# Patient Record
Sex: MALE | Race: BLACK OR AFRICAN AMERICAN | NOT HISPANIC OR LATINO | Employment: OTHER | URBAN - METROPOLITAN AREA
[De-identification: names, ages, dates, MRNs, and addresses within clinical notes are randomized per-mention and may not be internally consistent; named-entity substitution may affect disease eponyms.]

---

## 2020-10-06 PROBLEM — I10 HTN (HYPERTENSION): Status: ACTIVE | Noted: 2020-10-06

## 2020-10-12 ENCOUNTER — TELEPHONE (OUTPATIENT)
Dept: PULMONOLOGY | Facility: CLINIC | Age: 55
End: 2020-10-12

## 2020-10-12 NOTE — TELEPHONE ENCOUNTER
----- Message from Hussain Bailey sent at 10/12/2020  3:37 PM CDT -----  .Type:  Sooner Apoointment Request    Caller is requesting a sooner appointment.  Caller declined first available appointment listed below.  Caller will not accept being placed on the waitlist and is requesting a message be sent to doctor.  Name of Caller:Morro Juarez  When is the first available appointment?11/2020  Symptoms:Plural Effusion  Would the patient rather a call back or a response via [facility] [employer] [facility]? Call back  Best Call Back Number:[phone]  Additional Information:

## 2020-10-12 NOTE — TELEPHONE ENCOUNTER
----- Message from Cora Morse sent at 10/12/2020  2:02 PM CDT -----  Regarding: Urgent Appt Request  ALBINO Winkler would like to refer the following patient to the [facility]. I was unable to find an available appointment meeting the needs of an urgent appointment. Can you please assist with scheduling the following patient? The patients diagnosis is Large pleural effusion [J90]. There is a request placed in epic.   Please let me know if I can help schedule in any way.  Thank you,   Cora   Ext. [phone]  [facility]

## 2020-10-12 NOTE — TELEPHONE ENCOUNTER
Spoke to pt wife.  Offered appt 10/23/20.  She declined and stated she would call his PCP to try to find somewhere else sooner.

## 2024-08-08 ENCOUNTER — OFFICE VISIT (OUTPATIENT)
Dept: PODIATRY | Facility: CLINIC | Age: 59
End: 2024-08-08
Payer: COMMERCIAL

## 2024-08-08 VITALS — BODY MASS INDEX: 33.89 KG/M2 | WEIGHT: 242.06 LBS | HEIGHT: 71 IN

## 2024-08-08 DIAGNOSIS — S90.32XA HEMATOMA OF LEFT FOOT: Primary | ICD-10-CM

## 2024-08-08 PROCEDURE — 3008F BODY MASS INDEX DOCD: CPT | Mod: CPTII,S$GLB,, | Performed by: PODIATRIST

## 2024-08-08 PROCEDURE — 3044F HG A1C LEVEL LT 7.0%: CPT | Mod: CPTII,S$GLB,, | Performed by: PODIATRIST

## 2024-08-08 PROCEDURE — 4010F ACE/ARB THERAPY RXD/TAKEN: CPT | Mod: CPTII,S$GLB,, | Performed by: PODIATRIST

## 2024-08-08 PROCEDURE — 99204 OFFICE O/P NEW MOD 45 MIN: CPT | Mod: S$GLB,,, | Performed by: PODIATRIST

## 2024-08-08 PROCEDURE — 1159F MED LIST DOCD IN RCRD: CPT | Mod: CPTII,S$GLB,, | Performed by: PODIATRIST

## 2024-08-08 PROCEDURE — 99999 PR PBB SHADOW E&M-EST. PATIENT-LVL III: CPT | Mod: PBBFAC,,, | Performed by: PODIATRIST

## 2024-08-08 PROCEDURE — 1160F RVW MEDS BY RX/DR IN RCRD: CPT | Mod: CPTII,S$GLB,, | Performed by: PODIATRIST

## 2024-08-08 RX ORDER — METHYLPREDNISOLONE 4 MG/1
4 TABLET ORAL DAILY
Qty: 1 EACH | Refills: 0 | Status: SHIPPED | OUTPATIENT
Start: 2024-08-08

## 2024-08-08 NOTE — PROGRESS NOTES
Subjective:     Patient ID: Morro Juarez is a 59 y.o. male.    Chief Complaint: Foot Pain (Pt c/o top of left foot swelling/ pain  7/10, non-diabetic pt wears tennis shoes, PCP Patrice Blancas MD last seen 4-10-24)    Morro is a 59 y.o. male who presents to the podiatry clinic  with complaint of  left foot pain. Onset of the symptoms was  07/22/2024 . Precipitating event: He states that he was taking a lawnmower off of a trailer when a large ramp fell onto his left foot. . Current symptoms include: ability to bear weight, but with some pain, stiffness, and swelling. Aggravating factors: any weight bearing. Symptoms have gradually improved. . Evaluation to date: plain films: normal and MRI: normal. Treatment to date: prescription NSAIDS which are somewhat effective and pain medication . Patients rates pain 7/10 on pain scale.    Patient Active Problem List   Diagnosis    Chronic hepatitis C virus infection    HTN (hypertension)    Obesity       Medication List with Changes/Refills   New Medications    METHYLPREDNISOLONE (MEDROL DOSEPACK) 4 MG TABLET    Take 1 tablet (4 mg total) by mouth once daily. Use as instructed on dose pack   Current Medications    ASPIRIN (ECOTRIN) 81 MG EC TABLET    Take 81 mg by mouth.    ATORVASTATIN (LIPITOR) 40 MG TABLET    Take 40 mg by mouth once daily.    ETHAMBUTOL (MYAMBUTOL) 400 MG TAB    TAKE 4 TABLETS BY MOUTH ONCE DAILY    FLUCONAZOLE (DIFLUCAN) 200 MG TAB    Take 400 mg by mouth once daily.    ISONIAZID (NYDRAZID) 300 MG TAB    Take 300 mg by mouth once daily.    LISINOPRIL 10 MG TABLET    Take 10 mg by mouth once daily.    PYRAZINAMIDE 500 MG TAB    TAKE 4 TABLETS BY MOUTH ONCE DAILY    RIFAMPIN (RIFADIN) 300 MG CAPSULE    TAKE 2 CAPSULES ONCE DAILY       Review of patient's allergies indicates:  No Known Allergies    Past Surgical History:   Procedure Laterality Date    ABCESS DRAINAGE  07/28/2021    I&D with biopsies of prevertebral and retropharyngeal soft tissue and  "fluid collection    ABCESS DRAINAGE  06/20/2021    I&D intraorally of retropharyngeal and prevertebral abscess    APPENDECTOMY         Family History   Problem Relation Name Age of Onset    Diabetes Mother      Heart disease Mother      Hypertension Mother      Gout Mother      Diabetes Father      Heart disease Father      Hypertension Father      Gout Father         Social History     Socioeconomic History    Marital status:    Tobacco Use    Smoking status: Never    Smokeless tobacco: Current     Types: Snuff   Substance and Sexual Activity    Alcohol use: Not Currently    Drug use: Never       Vitals:    08/08/24 0734   Weight: 109.8 kg (242 lb 1 oz)   Height: 5' 11" (1.803 m)   PainSc:   7       Hemoglobin A1C   Date Value Ref Range Status   04/10/2024 5.0 4.2 - 5.8 % Final   08/10/2023 4.9 4.2 - 5.8 % Final   02/07/2023 4.9 4.2 - 5.8 % Final       Review of Systems   Constitutional:  Negative for chills and fever.   Respiratory:  Negative for shortness of breath.    Cardiovascular:  Negative for chest pain, palpitations, orthopnea, claudication and leg swelling.   Gastrointestinal:  Negative for diarrhea, nausea and vomiting.   Musculoskeletal:  Negative for joint pain.   Skin:  Negative for rash.   Neurological:  Negative for dizziness, tingling, sensory change, focal weakness and weakness.   Psychiatric/Behavioral: Negative.           Objective:       PHYSICAL EXAM: Apperance: Alert and orient in no distress,well developed, and with good attention to grooming and body habits  Patient presents ambulating in tennis shoes.   Lower Extremity Physical Exam:  VASCULAR: Dorsalis pedis pulses 2/4 bilateral and Posterior Tibial pulses 2/4 bilateral. . Mild edema observed left. Varicosities absent bilateral. Skin temperature of the lower extremities is warm to warm, proximal to distal. Hair growth WNL bilateral.  DERMATOLOGICAL: No skin rashes, subcutaneous nodules, lesions, or ulcers observed bilateral. " Palpable fixated subcutaneous nodule noted to left dorsal foot.  NEUROLOGICAL: Light touch, sharp-dull, proprioception all present and equal bilaterally.    MUSCULOSKELETAL: Muscle strength is 5/5 for foot inverters, everters, plantarflexors, and dorsiflexors. Muscle tone is normal. (+) pain on palpation of dorsal left midfoot.         Assessment:       ICD-10-CM ICD-9-CM   1. Hematoma of left foot  S90.32XA 924.20       Plan:   Hematoma of left foot  -     US Soft Tissue, Lower Extremity, Left; Future; Expected date: 08/08/2024  -     methylPREDNISolone (MEDROL DOSEPACK) 4 mg tablet; Take 1 tablet (4 mg total) by mouth once daily. Use as instructed on dose pack  Dispense: 1 each; Refill: 0      I counseled the patient on his conditions, regarding findings of my examination, my impressions, and usual treatment plan.   Ordered left foot soft tissue ultrasound to rule out hematoma.    Patient instructed on adequate icing techniques. Patient should ice the affected area at least once per day x 10 minutes for 10 days . I advised the  patient that extra icing would also be beneficial to ensure adequate anti inflammatory effect.   Prescribed Medrol Dosepak to be taken as directed on package. Discussed possible increase in blood sugar with taking steroid medication. Patient advised on the possible elevation of blood pressure sugar and caution to take pills as prescribed and to discontinue use if symptoms arise, patient agreed.  Discussed surgical and conservative management of hematoma deformity. Conservatively we did discuss padding, and shoe modifications such as softer shoes with wide toe boxes. Surgically we briefly discussed pre and post operative expectations.   Patient to return aft ultrasound completed.            Marii Pryor DPM  [facility]

## 2024-08-12 ENCOUNTER — HOSPITAL ENCOUNTER (OUTPATIENT)
Dept: RADIOLOGY | Facility: HOSPITAL | Age: 59
Discharge: HOME | End: 2024-08-12
Attending: PODIATRIST
Payer: COMMERCIAL

## 2024-08-12 DIAGNOSIS — S90.32XA HEMATOMA OF LEFT FOOT: ICD-10-CM

## 2024-08-12 PROCEDURE — 76882 US LMTD JT/FCL EVL NVASC XTR: CPT | Mod: TC,LT

## 2024-08-12 PROCEDURE — 76882 US LMTD JT/FCL EVL NVASC XTR: CPT | Mod: 26,LT,, | Performed by: RADIOLOGY

## 2024-08-21 ENCOUNTER — OFFICE VISIT (OUTPATIENT)
Dept: PODIATRY | Facility: CLINIC | Age: 59
End: 2024-08-21
Payer: COMMERCIAL

## 2024-08-21 VITALS — WEIGHT: 242.06 LBS | HEIGHT: 71 IN | BODY MASS INDEX: 33.89 KG/M2

## 2024-08-21 DIAGNOSIS — S90.32XA HEMATOMA OF LEFT FOOT: Primary | ICD-10-CM

## 2024-08-21 DIAGNOSIS — S90.32XD CONTUSION OF LEFT FOOT, SUBSEQUENT ENCOUNTER: ICD-10-CM

## 2024-08-21 PROCEDURE — 4010F ACE/ARB THERAPY RXD/TAKEN: CPT | Mod: CPTII,S$GLB,, | Performed by: PODIATRIST

## 2024-08-21 PROCEDURE — 3044F HG A1C LEVEL LT 7.0%: CPT | Mod: CPTII,S$GLB,, | Performed by: PODIATRIST

## 2024-08-21 PROCEDURE — 1159F MED LIST DOCD IN RCRD: CPT | Mod: CPTII,S$GLB,, | Performed by: PODIATRIST

## 2024-08-21 PROCEDURE — 99213 OFFICE O/P EST LOW 20 MIN: CPT | Mod: S$GLB,,, | Performed by: PODIATRIST

## 2024-08-21 PROCEDURE — 3008F BODY MASS INDEX DOCD: CPT | Mod: CPTII,S$GLB,, | Performed by: PODIATRIST

## 2024-08-21 PROCEDURE — 99999 PR PBB SHADOW E&M-EST. PATIENT-LVL IV: CPT | Mod: PBBFAC,,, | Performed by: PODIATRIST

## 2024-08-21 PROCEDURE — 1160F RVW MEDS BY RX/DR IN RCRD: CPT | Mod: CPTII,S$GLB,, | Performed by: PODIATRIST

## 2024-08-21 NOTE — PROGRESS NOTES
Subjective:     Patient ID: Morro Juarez is a 59 y.o. male.    Chief Complaint: Follow-up (Non-diabetic pt wears slippers, pt c/o the knot is not going down on his left foot, pt states no pain if he's not standing up for a long time and if he doesn't hit it, PCP Patrice Chapman last seen 7/29/2024)    Morro is a 59 y.o. male who presents to the podiatry clinic for follow up of left foot pain. Patient states pain is better and only painful if standing for a long period of time. Current symptoms include: ability to bear weight, but with some pain. Aggravating factors: standing. Symptoms have gradually improved. Treatment to date:  compression, medrol dose pack a little effective . Patients rates pain 5/10 on pain scale. Patient has no other pedal complaints at this time.  HPI:  He states on 07/22/2024 that he was taking a lawnmower off of a trailer when a large ramp fell onto his left foot.    Patient Active Problem List   Diagnosis    Chronic hepatitis C virus infection    HTN (hypertension)    Obesity       Medication List with Changes/Refills   Current Medications    ASPIRIN (ECOTRIN) 81 MG EC TABLET    Take 81 mg by mouth.    ATORVASTATIN (LIPITOR) 40 MG TABLET    Take 40 mg by mouth once daily.    ETHAMBUTOL (MYAMBUTOL) 400 MG TAB    TAKE 4 TABLETS BY MOUTH ONCE DAILY    FLUCONAZOLE (DIFLUCAN) 200 MG TAB    Take 400 mg by mouth once daily.    ISONIAZID (NYDRAZID) 300 MG TAB    Take 300 mg by mouth once daily.    LISINOPRIL 10 MG TABLET    Take 10 mg by mouth once daily.    METHYLPREDNISOLONE (MEDROL DOSEPACK) 4 MG TABLET    Take 1 tablet (4 mg total) by mouth once daily. Use as instructed on dose pack    PYRAZINAMIDE 500 MG TAB    TAKE 4 TABLETS BY MOUTH ONCE DAILY    RIFAMPIN (RIFADIN) 300 MG CAPSULE    TAKE 2 CAPSULES ONCE DAILY       Review of patient's allergies indicates:  No Known Allergies    Past Surgical History:   Procedure Laterality Date    ABCESS DRAINAGE  07/28/2021    I&D with biopsies of  "prevertebral and retropharyngeal soft tissue and fluid collection    ABCESS DRAINAGE  06/20/2021    I&D intraorally of retropharyngeal and prevertebral abscess    APPENDECTOMY         Family History   Problem Relation Name Age of Onset    Diabetes Mother      Heart disease Mother      Hypertension Mother      Gout Mother      Diabetes Father      Heart disease Father      Hypertension Father      Gout Father         Social History     Socioeconomic History    Marital status:    Tobacco Use    Smoking status: Never    Smokeless tobacco: Current     Types: Snuff   Substance and Sexual Activity    Alcohol use: Not Currently    Drug use: Never       Vitals:    08/21/24 0722   Weight: 109.8 kg (242 lb 1 oz)   Height: 5' 11" (1.803 m)   PainSc: 0-No pain       Review of Systems   Constitutional:  Negative for chills and fever.   Respiratory:  Negative for shortness of breath.    Cardiovascular:  Negative for chest pain, palpitations, orthopnea, claudication and leg swelling.   Gastrointestinal:  Negative for diarrhea, nausea and vomiting.   Musculoskeletal:  Negative for joint pain (left dorsal midfoot).   Skin:  Negative for rash.   Neurological:  Negative for dizziness, tingling, sensory change, focal weakness and weakness.   Psychiatric/Behavioral: Negative.             Objective:      PHYSICAL EXAM: Apperance: Alert and orient in no distress,well developed, and with good attention to grooming and body habits  Patient presents ambulating in tennis shoes.   Lower Extremity Physical Exam:  VASCULAR: Dorsalis pedis pulses 2/4 bilateral and Posterior Tibial pulses 2/4 bilateral. . Mild edema observed left. Varicosities absent bilateral. Skin temperature of the lower extremities is warm to warm, proximal to distal. Hair growth WNL bilateral.  DERMATOLOGICAL: No skin rashes, subcutaneous nodules, lesions, or ulcers observed bilateral. Palpable fixated subcutaneous nodule noted to left dorsal foot.  NEUROLOGICAL: Light " touch, sharp-dull, proprioception all present and equal bilaterally.    MUSCULOSKELETAL: Muscle strength is 5/5 for foot inverters, everters, plantarflexors, and dorsiflexors. Muscle tone is normal. (+) decreased pain on palpation of dorsal left midfoot.     TEST RESULTS: Ultrasound of left foot/ankle taken 08/12/2024 reveals large complex fluid collection dorsal foot area of concern presumably a large evolving hematoma given history of contusion           Assessment:       ICD-10-CM ICD-9-CM   1. Hematoma of left foot - Left Foot  S90.32XA 924.20   2. Contusion of left foot, subsequent encounter  S90.32XD V58.89     924.20       Plan:   Hematoma of left foot - Left Foot  -     Case Request Operating Room: EXCISION, MASS    Contusion of left foot, subsequent encounter  -     Case Request Operating Room: EXCISION, MASS      I counseled the patient on his conditions, regarding findings of my examination, my impressions, and usual treatment plan.   Reviewed left foot soft tissue ultrasound to that confirms hematoma.    Patient instructed on adequate icing techniques. Patient should ice the affected area at least once per day x 10 minutes for 10 days . I advised the  patient that extra icing would also be beneficial to ensure adequate anti inflammatory effect.   Prescribed Medrol Dosepak to be taken as directed on package. Discussed possible increase in blood sugar with taking steroid medication. Patient advised on the possible elevation of blood pressure sugar and caution to take pills as prescribed and to discontinue use if symptoms arise, patient agreed.  Discussed surgical and conservative management of hematoma deformity. Conservatively we did discuss padding, and shoe modifications such as softer shoes with wide toe boxes. Surgically we briefly discussed pre and post operative expectations.   Patient wishes to proceed with surgical excision of hematoma.   Patient tentatively scheduled for 09/04/2024 pending  medical clearance.   Consent reviewed and signed with patient.        Marii Pryor DPM  [facility]

## 2024-08-22 DIAGNOSIS — Z01.818 PRE-OP EXAM: Primary | ICD-10-CM

## 2024-08-27 ENCOUNTER — TELEPHONE (OUTPATIENT)
Dept: PREADMISSION TESTING | Facility: HOSPITAL | Age: 59
End: 2024-08-27
Payer: COMMERCIAL

## 2024-08-27 ENCOUNTER — HOSPITAL ENCOUNTER (OUTPATIENT)
Dept: CARDIOLOGY | Facility: HOSPITAL | Age: 59
Discharge: HOME | End: 2024-08-27
Attending: NURSE PRACTITIONER
Payer: COMMERCIAL

## 2024-08-27 ENCOUNTER — OFFICE VISIT (OUTPATIENT)
Dept: INTERNAL MEDICINE | Facility: CLINIC | Age: 59
End: 2024-08-27
Payer: COMMERCIAL

## 2024-08-27 VITALS
RESPIRATION RATE: 18 BRPM | HEART RATE: 78 BPM | DIASTOLIC BLOOD PRESSURE: 68 MMHG | TEMPERATURE: 98 F | OXYGEN SATURATION: 100 % | SYSTOLIC BLOOD PRESSURE: 123 MMHG

## 2024-08-27 DIAGNOSIS — Z01.818 PRE-OP EXAM: ICD-10-CM

## 2024-08-27 DIAGNOSIS — Z79.899 MEDICAL MARIJUANA USE: ICD-10-CM

## 2024-08-27 DIAGNOSIS — M54.12 CERVICAL RADICULOPATHY: ICD-10-CM

## 2024-08-27 DIAGNOSIS — S90.32XA HEMATOMA OF LEFT FOOT: Primary | ICD-10-CM

## 2024-08-27 DIAGNOSIS — Z86.11 HISTORY OF TUBERCULOSIS: ICD-10-CM

## 2024-08-27 PROBLEM — E78.5 HYPERLIPIDEMIA: Status: ACTIVE | Noted: 2024-08-27

## 2024-08-27 LAB
OHS QRS DURATION: 90 MS
OHS QTC CALCULATION: 441 MS

## 2024-08-27 PROCEDURE — 93005 ELECTROCARDIOGRAM TRACING: CPT

## 2024-08-27 PROCEDURE — 93010 ELECTROCARDIOGRAM REPORT: CPT | Mod: ,,, | Performed by: INTERNAL MEDICINE

## 2024-08-27 PROCEDURE — 99999 PR PBB SHADOW E&M-EST. PATIENT-LVL III: CPT | Mod: PBBFAC,,,

## 2024-08-27 RX ORDER — GABAPENTIN 600 MG/1
600 TABLET ORAL 3 TIMES DAILY
COMMUNITY

## 2024-08-27 RX ORDER — CYCLOBENZAPRINE HCL 10 MG
10 TABLET ORAL 3 TIMES DAILY PRN
COMMUNITY

## 2024-08-27 NOTE — PROGRESS NOTES
Preoperative History and Physical                                                            [facility]                                                                   Chief Complaint: Preoperative evaluation     History of Present Illness:      Morro Juarez is a 59 y.o. male who presents to the office today for a preoperative consultation at the request of Dr. REI Pryor who plans on performing a EXCISION, MASS (LEFT) on September 4.     Functional Status:      The patient is able to climb a flight of stairs slowly. The patient is able to ambulate independently without difficulty. The patient's functional status is affected by the surgical problem due to pain. The patient's functional status is not affected by shortness of breath, chest pain, dyspnea on exertion and fatigue.    MET score greater than 4    Patient Anesthesia History:      History of Malignant Hyperthermia: no  History of Pseudocholinesterase Deficiency: no  History of PONV: no  History of difficult intubation: no  History of delayed emergence: no  History of high fever after anesthesia: no    Family Anesthesia History:      History of Malignant Hyperthermia: no  History of Pseudocholinesterase Deficiency: no    Past Medical History:      Past Medical History:   Diagnosis Date    Chronic hepatitis C     Hyperlipidemia     Hypertension     Osteomyelitis     Retropharyngeal abscess 07/2021        Past Surgical History:      Past Surgical History:   Procedure Laterality Date    ABCESS DRAINAGE  07/28/2021    I&D with biopsies of prevertebral and retropharyngeal soft tissue and fluid collection    ABCESS DRAINAGE  06/20/2021    I&D intraorally of retropharyngeal and prevertebral abscess    APPENDECTOMY      CERVICAL FUSION          Social History:      Social History     Socioeconomic History    Marital status:    Tobacco Use    Smoking status: Never    Smokeless tobacco: Current     Types:  Snuff   Substance and Sexual Activity    Alcohol use: Not Currently    Drug use: Yes     Types: Marijuana     Comment: medical marijuana - last use 8/20/24        Family History:      Family History   Problem Relation Name Age of Onset    Diabetes Mother      Heart disease Mother      Hypertension Mother      Gout Mother      Diabetes Father      Heart disease Father      Hypertension Father      Gout Father         Allergies:      Review of patient's allergies indicates:  No Known Allergies    Medications:      Current Outpatient Medications   Medication Sig    aspirin (ECOTRIN) 81 MG EC tablet Take 81 mg by mouth.    atorvastatin (LIPITOR) 40 MG tablet Take 40 mg by mouth once daily.    cyclobenzaprine (FLEXERIL) 10 MG tablet Take 10 mg by mouth 3 (three) times daily as needed for Muscle spasms.    gabapentin (NEURONTIN) 600 MG tablet Take 600 mg by mouth 3 (three) times daily. Takes 1 tablet in the morning and 2 tablets at night    lisinopriL 10 MG tablet Take 10 mg by mouth once daily.    methylPREDNISolone (MEDROL DOSEPACK) 4 mg tablet Take 1 tablet (4 mg total) by mouth once daily. Use as instructed on dose pack     No current facility-administered medications for this visit.       Vitals:      Vitals:    08/27/24 0803   BP: 123/68   Pulse: 78   Resp: 18   Temp: 97.7 °F (36.5 °C)       Review of Systems:        Constitutional: Negative for fever, chills, weight loss, malaise/fatigue and diaphoresis.   HENT: Negative for hearing loss, ear pain, nosebleeds, congestion, sore throat, tinnitus and ear discharge.  + neck pain  Eyes: Negative for blurred vision, double vision, photophobia, pain, discharge and redness.   Respiratory: Negative for cough, hemoptysis, sputum production, shortness of breath, wheezing and stridor.    Cardiovascular: Negative for chest pain, palpitations, orthopnea, claudication, leg swelling and PND.   Gastrointestinal: Negative for heartburn, nausea, vomiting, abdominal pain, diarrhea,  constipation, blood in stool and melena.   Genitourinary: Negative for dysuria, urgency, frequency, hematuria and flank pain.   Musculoskeletal: Negative for myalgias, joint pain and falls. + back pain and + left foot pain  Skin: Negative for itching and rash.   Neurological: Negative for dizziness, tingling, tremors, sensory change, speech change, focal weakness, seizures, loss of consciousness, weakness and headaches.   Endo/Heme/Allergies: Negative for environmental allergies and polydipsia. Does not bruise/bleed easily.   Psychiatric/Behavioral: Negative for depression, suicidal ideas, hallucinations, memory loss and substance abuse. The patient is not nervous/anxious and does not have insomnia.    All 14 systems reviewed and negative except as noted above.    Physical Exam:      Constitutional: Appears well-developed, well-nourished and in no acute distress.  Patient is oriented to person, place, and time.   Head: Normocephalic and atraumatic. Mucous membranes moist.  Neck: Neck supple no mass.   Cardiovascular: Normal rate and regular rhythm.  S1 S2 appreciated by ascultation.  Pulmonary/Chest: Effort normal and clear to auscultation bilaterally. No respiratory distress.   Abdomen: Soft. Non-tender and non-distended. Bowel sounds are normal.   Neurological: Patient is alert and oriented to person, place and time. Moves all extremities.  Skin: Warm and dry. No lesions.  Extremities: No clubbing, cyanosis. Edema to left foot.  Limited range of motion to left foot secondary to pain    Laboratory data:      Reviewed and noted in plan where applicable. Please see chart for full laboratory data.    Lab Results   Component Value Date    INR 1.1 11/23/2020    INR 1.0 11/19/2020       Lab Results   Component Value Date    WBC 10.08 08/27/2024    HGB 13.8 (L) 08/27/2024    HCT 43.7 08/27/2024    MCV 92 08/27/2024     08/27/2024       Comprehensive Metabolic Panel  Order: [id number]  Status: Final result  Dx:  Pre-op exam    0 Result Notes        Component Ref Range & Units 09:15 3 yr ago   Sodium 136 - 145 mmol/L 138 137   Potassium 3.5 - 5.1 mmol/L 4.1    Chloride 95 - 110 mmol/L 109 105 R   CO2 23 - 29 mmol/L 20 Low  24 R   Glucose 70 - 110 mg/dL 99    BUN 8 - 23 mg/dL 19 17 R   Creatinine 0.5 - 1.4 mg/dL 1.3 0.96 R   Calcium 8.7 - 10.5 mg/dL 9.0 8.4 Low  R   Total Protein 6.0 - 8.4 g/dL 7.0    Albumin 3.5 - 5.2 g/dL 3.5    Total Bilirubin 0.1 - 1.0 mg/dL 0.7    Comment: For infants and newborns, interpretation of results should be based  on gestational age, weight and in agreement with clinical  observations.    Premature Infant recommended reference ranges:  Up to 24 hours.............<8.0 mg/dL  Up to 48 hours............<12.0 mg/dL  3-5 days..................<15.0 mg/dL  6-29 days.................<15.0 mg/dL   Alkaline Phosphatase 55 - 135 U/L 167 High     AST 10 - 40 U/L 12    ALT 10 - 44 U/L 18    eGFR >60 mL/min/1.73 m^2 >60    Anion Gap 8 - 16 mmol/L 9 8   Resulting Agency  [facility]        Specimen Collected: 08/27/24 09:15 CDT Last Resulted: 08/27/24 09:43 CDT     Predictors of intubation difficulty:       Morbid obesity? no   Anatomically abnormal facies? no   Prominent incisors? no   Receding mandible? no   Short, thick neck? no   Neck range of motion: abnormal   Dentition: No chipped, loose, or missing teeth.  Based on the Modified Mallampati, patient is a mallampati score: IV (only hard palate visible)- evaluated by anesthesia     Cardiographics:      ECG: Normal sinus rhythm   Nonspecific T wave abnormality   Abnormal ECG   No previous ECGs available   Confirmed by MONISHA QUIÑONEZ MD ([id number]) on 8/27/2024 11:03:20 AM     Echocardiogram: not indicated    Imaging:      Chest x-ray: The lungs are clear.  The cardiac silhouette and mediastinum are   within normal limits.  Osseous structures and soft tissues are within normal   Limits per imaging on 04/10/2024.  CT of chest without contrast  on 04/30/2024 also available.    Assessment and Plan:      1. Hematoma of left foot  Assessment & Plan:  EXCISION, MASS (LEFT) planned per Dr. REI Pryor on 9/4/2024     Known risk factors for perioperative complications: None    Difficulty with intubation is not anticipated.    Cardiac Risk Estimation: Based on the Revised Cardiac Risk index, patient is a Class I risk with a 3.9 % risk of a major cardiac event in a low risk procedure.    1.) Preoperative workup as follows: chest x-ray, ECG, hemoglobin, hematocrit, electrolytes, creatinine, glucose.  2.) Change in medication regimen before surgery: discontinue ASA 5 days before surgery, discontinue NSAIDs 5 days before surgery, hold Metformin 24 hours prior to surgery. Hold all vitamins/supplements for 7 days prior to surgery, with the exception of Potassium and Iron supplementation. Hold semaglutide/tirzepatide for 7 days prior to surgery.   3.) Prophylaxis for cardiac events with perioperative beta-blockers: not indicated.  4.) Invasive hemodynamic monitoring perioperatively: at the discretion of anesthesiologist.  5.) Deep vein thrombosis prophylaxis postoperatively: regimen to be chosen by surgical team.  6.) Surveillance for postoperative MI with ECG immediately postoperatively and on postoperati ve days 1 and 2 AND troponin levels 24 hours postoperatively and on day 4 or hospital discharge (whichever comes first): at the discretion of anesthesiologist.  7.) Current medications which may produce withdrawal symptoms if withheld perioperatively: None   8.) Other measures: Postoperative hypertension management with IV hydralazine until able to take oral medications.     --Morning of Procedure medications: None  --Hold all other medications morning of surgery   --Resume all medications post-operatively  --Hold and all vitamins/supplements 7 days prior to procedure  --Hold oral diabetes medications morning of surgery   --Hold metformin 24 hours prior to surgery    --Hold Ozempic 7 days prior to surgery   --methylprednisone prescribed per podiatry-completed  --aspirin to be held 5 days prior to procedure per primary surgeon- last dose 8/27/2024      2. Pre-op exam  -     Ambulatory referral/consult to Pre-Admit  -     CBC Auto Differential; Future; Expected date: 08/27/2024  -     Comprehensive Metabolic Panel; Future; Expected date: 08/27/2024  -     EKG 12-lead; Future; Expected date: 08/27/2024    3. Cervical radiculopathy  Assessment & Plan:  -chronic status post cervical fusion at [facility] per Dr. Venegas  -patient seen in [facility] with abnormal cervical range of motion noted-evaluated by anesthesia  -gabapentin continued  -Flexeril continued as needed- hold morning of surgery      4. History of tuberculosis  Assessment & Plan:  -patient reports history of pharyngeal abscess with I and D in 2021  -patient treated for TB with ethambutol, isoniazid, pyrazinamide, and rifampin  -CT of chest without contrast showed no residual lung nodules.  Interval resolution of the previously identified 0.5   cm benign pleural-based opacity in the lateral right upper lobe per imaging on 4/30/2024     5. Hypertension  -controlled  -lisinopril continued- hold morning of surgery    6. Hyperlipidemia  -atorvastatin continued nightly    7. Medical marijuana use  -patient reports last use on 08/20/2024  -patient advised to refrain from marijuana use for 72 hours prior to procedure    8. Lumbar spondylosis  - Neurontin continued  -Flexeril continued as needed- hold morning of surgery  -followed outpatient by pain management           Electronically signed by Sara Ibarra NP on 8/27/2024 at 8:24 AM.

## 2024-08-27 NOTE — ASSESSMENT & PLAN NOTE
EXCISION, MASS (LEFT) planned per Dr. REI Pryor on 9/4/2024     Known risk factors for perioperative complications: None    Difficulty with intubation is not anticipated.    Cardiac Risk Estimation: Based on the Revised Cardiac Risk index, patient is a Class I risk with a 3.9 % risk of a major cardiac event in a low risk procedure.    1.) Preoperative workup as follows: chest x-ray, ECG, hemoglobin, hematocrit, electrolytes, creatinine, glucose.  2.) Change in medication regimen before surgery: discontinue ASA 6 days before surgery, discontinue NSAIDs 5 days before surgery, hold Metformin 24 hours prior to surgery. Hold all vitamins/supplements for 7 days prior to surgery, with the exception of Potassium and Iron supplementation. Hold semaglutide/tirzepatide for 7 days prior to surgery.   3.) Prophylaxis for cardiac events with perioperative beta-blockers: not indicated.  4.) Invasive hemodynamic monitoring perioperatively: at the discretion of anesthesiologist.  5.) Deep vein thrombosis prophylaxis postoperatively: regimen to be chosen by surgical team.  6.) Surveillance for postoperative MI with ECG immediately postoperatively and on postoperati ve days 1 and 2 AND troponin levels 24 hours postoperatively and on day 4 or hospital discharge (whichever comes first): at the discretion of anesthesiologist.  7.) Current medications which may produce withdrawal symptoms if withheld perioperatively: None   8.) Other measures: Postoperative hypertension management with IV hydralazine until able to take oral medications.     --Morning of Procedure medications: None  --Hold all other medications morning of surgery   --Resume all medications post-operatively  --Hold and all vitamins/supplements 7 days prior to procedure  --Hold oral diabetes medications morning of surgery   --Hold metformin 24 hours prior to surgery   --Hold Ozempic 7 days prior to surgery   --methylprednisone prescribed per podiatry-completed

## 2024-08-27 NOTE — TELEPHONE ENCOUNTER
To confirm, your doctor has instructed you: Surgery is scheduled for 09/04/24.   Pre admit office will call the afternoon prior to surgery between 1PM and 3PM with arrival time.    Surgery will be at [facility],  The address is [address]. [place] [id number].      IMPORTANT INSTRUCTIONS!    Do not eat or drink after 12 midnight, including water. Do not smoke or use chewing tobacco after 12 midnight!  OK to brush teeth, but no gum, candy, or mints!      *Take only these medicines with a small swallow of water-morning of surgery*     none         ____ Stop taking all vitamins, herbal supplements, Aspirin, & NSAIDS (Ibuprofen, Advil, Aleve) 7 days prior to surgery, as these can thin your blood.    ____ Weight loss medication, such as Adipex and Phentermine, must be stopped 14 days prior to surgery, no exceptions!    *Diabetic Patients: If you take diabetic or weight loss medication, do NOT take morning of surgery unless instructed by Doctor. Metformin to be stopped 24 hrs prior to surgery. DO NOT take short-acting insulin the day of surgery. Only take HALF of your regular dose of long-acting insulin the night before surgery, unless instructed otherwise. Blood sugars will be checked in pre-op.   ~Ozempic/Mounjaro/Wegovy/Trulicity/Semaglutide injections must be stopped 7 days prior to surgery.     Please notify MD office if you develop an active infection, are prescribed antibiotics by someone other than the surgeon doing your surgery, or visit urgent care/ER.      Bathing Instructions:   The night before surgery and the morning prior to coming to the hospital:    - Shower & rinse your body as usual with anti-bacterial Soap (Dial or Lever 2000)   -Hibiclens (if indicated) use AFTER anti-bacterial soap; 1 packet PM/1 packet in AM on surgical site only   -Do not use hibiclens on your head, face, or genitals.    -Do not wash with anti-bacterial soap after you use the hibiclens.    -Do not shave  surgical site 5-7 days prior to surgery.    -Pubic hair 7 days prior to surgery (OBGYN/Urology only).       Additional Instructions:   __ No makeup, powder, lotions, creams, or body spray to skin   __ No deodorant if you are having: breast procedure, PORT, or upper shoulder surgery!   __ No nail polish or artificial nails       **SURGERY WILL BE CANCELLED IF ARTIFICIAL/NAIL POLISH IS PRESENT!!!**  __ Please remove all piercings and leave all jewelry at home.    **SURGERY WILL BE CANCELLED IF PIERCINGS ARE PRESENT!!!**    __ Dentures, Hearing Aids and Contact Lens need to be removed prior to the start of surgery.    __ Avoid Alcoholic beverages 3 days prior to surgery, as it can thin the blood, unless told otherwise by pre-admit department.  __ Females: may need to give a urine sample the morning of surgery;   **Please ask  for a specimen cup if you need to use the restroom prior to being called into pre-op.**  __ Males: Stop ED medications (Viagra, Cialis) 24 hrs prior to surgery.  __ Wear clean, loose-fitting clothing. Allow for dressings/bandages/surgical equipment   __ You must have transportation, and they MUST stay the entire time.   __  Bring photo ID and insurance information to hospital      [facility] Visitor/Ride Policy:   Only 1 adult allowed (over the age of 18) to accompany you and MUST STAY through the entire length of admission.     -Must have a ride home from a responsible adult that you know and trust.    -Medical Transport, Uber or Lyft can only be used if patient has a responsible adult to accompany them during ride home.    ~Your ride MUST STAY the entire time until you are discharged~    *If you are running late or have questions the morning of surgery, please call the Pre-OP Department @ [phone].       *Please Call [facility] Pre-Admit Department for surgery instruction questions:   [phone]       Financial Questions:                                                       Additional Payment Question Numbers:   qhel: [phone]                                                               [phone] or [phone]

## 2024-08-27 NOTE — ASSESSMENT & PLAN NOTE
-patient reports history of pharyngeal abscess with I and D in 2021  -patient treated for TB with ethambutol, isoniazid, pyrazinamide, and rifampin  -CT of chest without contrast showed no residual lung nodules.  Interval resolution of the previously identified 0.5   cm benign pleural-based opacity in the lateral right upper lobe per imaging on 4/30/2024

## 2024-08-27 NOTE — H&P (VIEW-ONLY)
Preoperative History and Physical                                                            [facility]                                                                   Chief Complaint: Preoperative evaluation     History of Present Illness:      Morro Juarez is a 59 y.o. male who presents to the office today for a preoperative consultation at the request of Dr. REI Pryor who plans on performing a EXCISION, MASS (LEFT) on September 4.     Functional Status:      The patient is able to climb a flight of stairs slowly. The patient is able to ambulate independently without difficulty. The patient's functional status is affected by the surgical problem due to pain. The patient's functional status is not affected by shortness of breath, chest pain, dyspnea on exertion and fatigue.    MET score greater than 4    Patient Anesthesia History:      History of Malignant Hyperthermia: no  History of Pseudocholinesterase Deficiency: no  History of PONV: no  History of difficult intubation: no  History of delayed emergence: no  History of high fever after anesthesia: no    Family Anesthesia History:      History of Malignant Hyperthermia: no  History of Pseudocholinesterase Deficiency: no    Past Medical History:      Past Medical History:   Diagnosis Date    Chronic hepatitis C     Hyperlipidemia     Hypertension     Osteomyelitis     Retropharyngeal abscess 07/2021        Past Surgical History:      Past Surgical History:   Procedure Laterality Date    ABCESS DRAINAGE  07/28/2021    I&D with biopsies of prevertebral and retropharyngeal soft tissue and fluid collection    ABCESS DRAINAGE  06/20/2021    I&D intraorally of retropharyngeal and prevertebral abscess    APPENDECTOMY      CERVICAL FUSION          Social History:      Social History     Socioeconomic History    Marital status:    Tobacco Use    Smoking status: Never    Smokeless tobacco: Current     Types:  Snuff   Substance and Sexual Activity    Alcohol use: Not Currently    Drug use: Yes     Types: Marijuana     Comment: medical marijuana - last use 8/20/24        Family History:      Family History   Problem Relation Name Age of Onset    Diabetes Mother      Heart disease Mother      Hypertension Mother      Gout Mother      Diabetes Father      Heart disease Father      Hypertension Father      Gout Father         Allergies:      Review of patient's allergies indicates:  No Known Allergies    Medications:      Current Outpatient Medications   Medication Sig    aspirin (ECOTRIN) 81 MG EC tablet Take 81 mg by mouth.    atorvastatin (LIPITOR) 40 MG tablet Take 40 mg by mouth once daily.    cyclobenzaprine (FLEXERIL) 10 MG tablet Take 10 mg by mouth 3 (three) times daily as needed for Muscle spasms.    gabapentin (NEURONTIN) 600 MG tablet Take 600 mg by mouth 3 (three) times daily. Takes 1 tablet in the morning and 2 tablets at night    lisinopriL 10 MG tablet Take 10 mg by mouth once daily.    methylPREDNISolone (MEDROL DOSEPACK) 4 mg tablet Take 1 tablet (4 mg total) by mouth once daily. Use as instructed on dose pack     No current facility-administered medications for this visit.       Vitals:      Vitals:    08/27/24 0803   BP: 123/68   Pulse: 78   Resp: 18   Temp: 97.7 °F (36.5 °C)       Review of Systems:        Constitutional: Negative for fever, chills, weight loss, malaise/fatigue and diaphoresis.   HENT: Negative for hearing loss, ear pain, nosebleeds, congestion, sore throat, tinnitus and ear discharge.  + neck pain  Eyes: Negative for blurred vision, double vision, photophobia, pain, discharge and redness.   Respiratory: Negative for cough, hemoptysis, sputum production, shortness of breath, wheezing and stridor.    Cardiovascular: Negative for chest pain, palpitations, orthopnea, claudication, leg swelling and PND.   Gastrointestinal: Negative for heartburn, nausea, vomiting, abdominal pain, diarrhea,  constipation, blood in stool and melena.   Genitourinary: Negative for dysuria, urgency, frequency, hematuria and flank pain.   Musculoskeletal: Negative for myalgias, joint pain and falls. + back pain and + left foot pain  Skin: Negative for itching and rash.   Neurological: Negative for dizziness, tingling, tremors, sensory change, speech change, focal weakness, seizures, loss of consciousness, weakness and headaches.   Endo/Heme/Allergies: Negative for environmental allergies and polydipsia. Does not bruise/bleed easily.   Psychiatric/Behavioral: Negative for depression, suicidal ideas, hallucinations, memory loss and substance abuse. The patient is not nervous/anxious and does not have insomnia.    All 14 systems reviewed and negative except as noted above.    Physical Exam:      Constitutional: Appears well-developed, well-nourished and in no acute distress.  Patient is oriented to person, place, and time.   Head: Normocephalic and atraumatic. Mucous membranes moist.  Neck: Neck supple no mass.   Cardiovascular: Normal rate and regular rhythm.  S1 S2 appreciated by ascultation.  Pulmonary/Chest: Effort normal and clear to auscultation bilaterally. No respiratory distress.   Abdomen: Soft. Non-tender and non-distended. Bowel sounds are normal.   Neurological: Patient is alert and oriented to person, place and time. Moves all extremities.  Skin: Warm and dry. No lesions.  Extremities: No clubbing, cyanosis. Edema to left foot.  Limited range of motion to left foot secondary to pain    Laboratory data:      Reviewed and noted in plan where applicable. Please see chart for full laboratory data.    Lab Results   Component Value Date    INR 1.1 11/23/2020    INR 1.0 11/19/2020       Lab Results   Component Value Date    WBC 10.08 08/27/2024    HGB 13.8 (L) 08/27/2024    HCT 43.7 08/27/2024    MCV 92 08/27/2024     08/27/2024       Comprehensive Metabolic Panel  Order: [id number]  Status: Final result  Dx:  Pre-op exam    0 Result Notes        Component Ref Range & Units 09:15 3 yr ago   Sodium 136 - 145 mmol/L 138 137   Potassium 3.5 - 5.1 mmol/L 4.1    Chloride 95 - 110 mmol/L 109 105 R   CO2 23 - 29 mmol/L 20 Low  24 R   Glucose 70 - 110 mg/dL 99    BUN 8 - 23 mg/dL 19 17 R   Creatinine 0.5 - 1.4 mg/dL 1.3 0.96 R   Calcium 8.7 - 10.5 mg/dL 9.0 8.4 Low  R   Total Protein 6.0 - 8.4 g/dL 7.0    Albumin 3.5 - 5.2 g/dL 3.5    Total Bilirubin 0.1 - 1.0 mg/dL 0.7    Comment: For infants and newborns, interpretation of results should be based  on gestational age, weight and in agreement with clinical  observations.    Premature Infant recommended reference ranges:  Up to 24 hours.............<8.0 mg/dL  Up to 48 hours............<12.0 mg/dL  3-5 days..................<15.0 mg/dL  6-29 days.................<15.0 mg/dL   Alkaline Phosphatase 55 - 135 U/L 167 High     AST 10 - 40 U/L 12    ALT 10 - 44 U/L 18    eGFR >60 mL/min/1.73 m^2 >60    Anion Gap 8 - 16 mmol/L 9 8   Resulting Agency  [facility]        Specimen Collected: 08/27/24 09:15 CDT Last Resulted: 08/27/24 09:43 CDT     Predictors of intubation difficulty:       Morbid obesity? no   Anatomically abnormal facies? no   Prominent incisors? no   Receding mandible? no   Short, thick neck? no   Neck range of motion: abnormal   Dentition: No chipped, loose, or missing teeth.  Based on the Modified Mallampati, patient is a mallampati score: IV (only hard palate visible)- evaluated by anesthesia     Cardiographics:      ECG: Normal sinus rhythm   Nonspecific T wave abnormality   Abnormal ECG   No previous ECGs available   Confirmed by MONISHA QUIÑONEZ MD ([id number]) on 8/27/2024 11:03:20 AM     Echocardiogram: not indicated    Imaging:      Chest x-ray: The lungs are clear.  The cardiac silhouette and mediastinum are   within normal limits.  Osseous structures and soft tissues are within normal   Limits per imaging on 04/10/2024.  CT of chest without contrast  on 04/30/2024 also available.    Assessment and Plan:      1. Hematoma of left foot  Assessment & Plan:  EXCISION, MASS (LEFT) planned per Dr. REI Pryor on 9/4/2024     Known risk factors for perioperative complications: None    Difficulty with intubation is not anticipated.    Cardiac Risk Estimation: Based on the Revised Cardiac Risk index, patient is a Class I risk with a 3.9 % risk of a major cardiac event in a low risk procedure.    1.) Preoperative workup as follows: chest x-ray, ECG, hemoglobin, hematocrit, electrolytes, creatinine, glucose.  2.) Change in medication regimen before surgery: discontinue ASA 5 days before surgery, discontinue NSAIDs 5 days before surgery, hold Metformin 24 hours prior to surgery. Hold all vitamins/supplements for 7 days prior to surgery, with the exception of Potassium and Iron supplementation. Hold semaglutide/tirzepatide for 7 days prior to surgery.   3.) Prophylaxis for cardiac events with perioperative beta-blockers: not indicated.  4.) Invasive hemodynamic monitoring perioperatively: at the discretion of anesthesiologist.  5.) Deep vein thrombosis prophylaxis postoperatively: regimen to be chosen by surgical team.  6.) Surveillance for postoperative MI with ECG immediately postoperatively and on postoperati ve days 1 and 2 AND troponin levels 24 hours postoperatively and on day 4 or hospital discharge (whichever comes first): at the discretion of anesthesiologist.  7.) Current medications which may produce withdrawal symptoms if withheld perioperatively: None   8.) Other measures: Postoperative hypertension management with IV hydralazine until able to take oral medications.     --Morning of Procedure medications: None  --Hold all other medications morning of surgery   --Resume all medications post-operatively  --Hold and all vitamins/supplements 7 days prior to procedure  --Hold oral diabetes medications morning of surgery   --Hold metformin 24 hours prior to surgery    --Hold Ozempic 7 days prior to surgery   --methylprednisone prescribed per podiatry-completed  --aspirin to be held 5 days prior to procedure per primary surgeon- last dose 8/27/2024      2. Pre-op exam  -     Ambulatory referral/consult to Pre-Admit  -     CBC Auto Differential; Future; Expected date: 08/27/2024  -     Comprehensive Metabolic Panel; Future; Expected date: 08/27/2024  -     EKG 12-lead; Future; Expected date: 08/27/2024    3. Cervical radiculopathy  Assessment & Plan:  -chronic status post cervical fusion at [facility] per Dr. Venegas  -patient seen in [facility] with abnormal cervical range of motion noted-evaluated by anesthesia  -gabapentin continued  -Flexeril continued as needed- hold morning of surgery      4. History of tuberculosis  Assessment & Plan:  -patient reports history of pharyngeal abscess with I and D in 2021  -patient treated for TB with ethambutol, isoniazid, pyrazinamide, and rifampin  -CT of chest without contrast showed no residual lung nodules.  Interval resolution of the previously identified 0.5   cm benign pleural-based opacity in the lateral right upper lobe per imaging on 4/30/2024     5. Hypertension  -controlled  -lisinopril continued- hold morning of surgery    6. Hyperlipidemia  -atorvastatin continued nightly    7. Medical marijuana use  -patient reports last use on 08/20/2024  -patient advised to refrain from marijuana use for 72 hours prior to procedure    8. Lumbar spondylosis  - Neurontin continued  -Flexeril continued as needed- hold morning of surgery  -followed outpatient by pain management           Electronically signed by Sara Ibarra NP on 8/27/2024 at 8:24 AM.

## 2024-08-27 NOTE — ASSESSMENT & PLAN NOTE
-chronic status post cervical fusion at [facility] per Dr. Venegas  -patient seen in PAT with abnormal cervical range of motion noted-evaluated by anesthesia  -gabapentin continued  -Flexeril continued as needed- hold morning of surgery

## 2024-08-27 NOTE — DISCHARGE INSTRUCTIONS
To confirm, your doctor has instructed you: Surgery is scheduled for 09/04/24.   Pre admit office will call the afternoon prior to surgery between 1PM and 3PM with arrival time.    Surgery will be at [facility],  The address is [address]. [place] [id number].      IMPORTANT INSTRUCTIONS!    Do not eat or drink after 12 midnight, including water. Do not smoke or use chewing tobacco after 12 midnight!  OK to brush teeth, but no gum, candy, or mints!      *Take only these medicines with a small swallow of water-morning of surgery*     none         ____ Stop taking all vitamins, herbal supplements, Aspirin, & NSAIDS (Ibuprofen, Advil, Aleve) 7 days prior to surgery, as these can thin your blood.    ____ Weight loss medication, such as Adipex and Phentermine, must be stopped 14 days prior to surgery, no exceptions!    *Diabetic Patients: If you take diabetic or weight loss medication, do NOT take morning of surgery unless instructed by Doctor. Metformin to be stopped 24 hrs prior to surgery. DO NOT take short-acting insulin the day of surgery. Only take HALF of your regular dose of long-acting insulin the night before surgery, unless instructed otherwise. Blood sugars will be checked in pre-op.   ~Ozempic/Mounjaro/Wegovy/Trulicity/Semaglutide injections must be stopped 7 days prior to surgery.     Please notify MD office if you develop an active infection, are prescribed antibiotics by someone other than the surgeon doing your surgery, or visit urgent care/ER.      Bathing Instructions:   The night before surgery and the morning prior to coming to the hospital:    - Shower & rinse your body as usual with anti-bacterial Soap (Dial or Lever 2000)   -Hibiclens (if indicated) use AFTER anti-bacterial soap; 1 packet PM/1 packet in AM on surgical site only   -Do not use hibiclens on your head, face, or genitals.    -Do not wash with anti-bacterial soap after you use the hibiclens.    -Do not shave  surgical site 5-7 days prior to surgery.    -Pubic hair 7 days prior to surgery (OBGYN/Urology only).       Additional Instructions:   __ No makeup, powder, lotions, creams, or body spray to skin   __ No deodorant if you are having: breast procedure, PORT, or upper shoulder surgery!   __ No nail polish or artificial nails       **SURGERY WILL BE CANCELLED IF ARTIFICIAL/NAIL POLISH IS PRESENT!!!**  __ Please remove all piercings and leave all jewelry at home.    **SURGERY WILL BE CANCELLED IF PIERCINGS ARE PRESENT!!!**    __ Dentures, Hearing Aids and Contact Lens need to be removed prior to the start of surgery.    __ Avoid Alcoholic beverages 3 days prior to surgery, as it can thin the blood, unless told otherwise by pre-admit department.  __ Females: may need to give a urine sample the morning of surgery;   **Please ask  for a specimen cup if you need to use the restroom prior to being called into pre-op.**  __ Males: Stop ED medications (Viagra, Cialis) 24 hrs prior to surgery.  __ Wear clean, loose-fitting clothing. Allow for dressings/bandages/surgical equipment   __ You must have transportation, and they MUST stay the entire time.   __  Bring photo ID and insurance information to hospital      [facility] Visitor/Ride Policy:   Only 1 adult allowed (over the age of 18) to accompany you and MUST STAY through the entire length of admission.     -Must have a ride home from a responsible adult that you know and trust.    -Medical Transport, Uber or Lyft can only be used if patient has a responsible adult to accompany them during ride home.    ~Your ride MUST STAY the entire time until you are discharged~    *If you are running late or have questions the morning of surgery, please call the Pre-OP Department @ [phone].       *Please Call [facility] Pre-Admit Department for surgery instruction questions:   [phone]       Financial Questions:                                                       Additional Payment Question Numbers:   qhel: [phone]                                                               [phone] or [phone]

## 2024-08-27 NOTE — ASSESSMENT & PLAN NOTE
-patient reports last use on 08/20/2024  -patient advised to refrain from marijuana use for 72 hours prior to procedure

## 2024-09-03 ENCOUNTER — ANESTHESIA EVENT (OUTPATIENT)
Dept: SURGERY | Facility: HOSPITAL | Age: 59
End: 2024-09-03
Payer: COMMERCIAL

## 2024-09-03 ENCOUNTER — TELEPHONE (OUTPATIENT)
Dept: PODIATRY | Facility: CLINIC | Age: 59
End: 2024-09-03
Payer: COMMERCIAL

## 2024-09-03 ENCOUNTER — PATIENT MESSAGE (OUTPATIENT)
Dept: RESPIRATORY THERAPY | Facility: HOSPITAL | Age: 59
End: 2024-09-03
Payer: COMMERCIAL

## 2024-09-03 NOTE — TELEPHONE ENCOUNTER
Called the pt to let her know they need to be at the OR for 5:30am             ----- Message from Thuy Butts sent at 9/3/2024  2:13 PM CDT -----  Contact: Skylar/ wife  .Type:  Needs Medical Advice    Who Called:  Skylar     Would the patient rather a call back or a response via [facility]?  Call back   Best Call Back Number:   [phone]   Additional Information:  Skylar would like to get a call back to confirm the time to be at the facility on 09/04 for pt procedure    Thanks

## 2024-09-03 NOTE — ANESTHESIA PREPROCEDURE EVALUATION
09/03/2024  Morro Juarez is a 59 y.o., male.      Pre-op Assessment    I have reviewed the Patient Summary Reports.     I have reviewed the Nursing Notes. I have reviewed the NPO Status.   I have reviewed the Medications.     Review of Systems  Anesthesia Hx:  No problems with previous Anesthesia  Pt with Mal 4 on exam, previous intubation on chart without problems. History of prior surgery of interest to airway management or planning:  Previous anesthesia: General        Denies Family Hx of Anesthesia complications.    Denies Personal Hx of Anesthesia complications.                    Social:  Non-Smoker       Hematology/Oncology:  Hematology Normal                                     Cardiovascular:     Hypertension                                        Pulmonary:  Pulmonary Normal                       Renal/:  Renal/ Normal                 Hepatic/GI:      Liver Disease, Hepatitis, C Chronic hepatitis C.          Musculoskeletal:         Spine Disorders: cervical            Neurological:  Neurology Normal                                      Endocrine:        Obesity / BMI > 30  Psych:  Psychiatric Normal                    Physical Exam  General: Alert and Oriented    Airway:  Mallampati: III   Mouth Opening: Normal  TM Distance: Normal  Tongue: Large  Neck ROM: Extension Decreased    Dental:  Intact  beard  Chest/Lungs:  Clear to auscultation, Normal Respiratory Rate    Heart:  Rate: Normal  Rhythm: Regular Rhythm        Anesthesia Plan  Type of Anesthesia, risks & benefits discussed:    Anesthesia Type: MAC, Gen Natural Airway, Gen Supraglottic Airway  Intra-op Monitoring Plan: Standard ASA Monitors  Post Op Pain Control Plan: multimodal analgesia and IV/PO Opioids PRN  Induction:  IV  Informed Consent: Informed consent signed with the Patient and all parties understand the risks and agree with  anesthesia plan.  All questions answered.   ASA Score: 3  Day of Surgery Review of History & Physical: H&P completed by Anesthesiologist.    Ready For Surgery From Anesthesia Perspective.     .

## 2024-09-04 ENCOUNTER — HOSPITAL ENCOUNTER (OUTPATIENT)
Facility: HOSPITAL | Age: 59
Discharge: HOME | End: 2024-09-04
Attending: PODIATRIST | Admitting: PODIATRIST
Payer: COMMERCIAL

## 2024-09-04 ENCOUNTER — ANESTHESIA (OUTPATIENT)
Dept: SURGERY | Facility: HOSPITAL | Age: 59
End: 2024-09-04
Payer: COMMERCIAL

## 2024-09-04 VITALS
TEMPERATURE: 98 F | SYSTOLIC BLOOD PRESSURE: 111 MMHG | OXYGEN SATURATION: 98 % | HEART RATE: 65 BPM | BODY MASS INDEX: 41 KG/M2 | HEIGHT: 71 IN | DIASTOLIC BLOOD PRESSURE: 69 MMHG | WEIGHT: 292.88 LBS | RESPIRATION RATE: 19 BRPM

## 2024-09-04 DIAGNOSIS — S90.30XA: ICD-10-CM

## 2024-09-04 DIAGNOSIS — S90.32XA HEMATOMA OF LEFT FOOT: Primary | ICD-10-CM

## 2024-09-04 PROCEDURE — 25000003 PHARM REV CODE 250: Performed by: PODIATRIST

## 2024-09-04 PROCEDURE — 63600175 PHARM REV CODE 636 W HCPCS: Performed by: NURSE ANESTHETIST, CERTIFIED REGISTERED

## 2024-09-04 PROCEDURE — 37000008 HC ANESTHESIA 1ST 15 MINUTES: Performed by: PODIATRIST

## 2024-09-04 PROCEDURE — 71000015 HC POSTOP RECOV 1ST HR: Performed by: PODIATRIST

## 2024-09-04 PROCEDURE — 36000706: Performed by: PODIATRIST

## 2024-09-04 PROCEDURE — 37000009 HC ANESTHESIA EA ADD 15 MINS: Performed by: PODIATRIST

## 2024-09-04 PROCEDURE — 25000003 PHARM REV CODE 250: Performed by: NURSE ANESTHETIST, CERTIFIED REGISTERED

## 2024-09-04 PROCEDURE — 36000707: Performed by: PODIATRIST

## 2024-09-04 PROCEDURE — 63600175 PHARM REV CODE 636 W HCPCS: Mod: JZ,JG | Performed by: PODIATRIST

## 2024-09-04 PROCEDURE — 71000033 HC RECOVERY, INTIAL HOUR: Performed by: PODIATRIST

## 2024-09-04 PROCEDURE — 28043 EXC FOOT/TOE TUM SC < 1.5 CM: CPT | Mod: LT,,, | Performed by: PODIATRIST

## 2024-09-04 RX ORDER — LIDOCAINE HYDROCHLORIDE 10 MG/ML
INJECTION, SOLUTION EPIDURAL; INFILTRATION; INTRACAUDAL; PERINEURAL
Status: DISCONTINUED | OUTPATIENT
Start: 2024-09-04 | End: 2024-09-04 | Stop reason: HOSPADM

## 2024-09-04 RX ORDER — FENTANYL CITRATE 50 UG/ML
INJECTION, SOLUTION INTRAMUSCULAR; INTRAVENOUS
Status: DISCONTINUED | OUTPATIENT
Start: 2024-09-04 | End: 2024-09-04

## 2024-09-04 RX ORDER — OXYCODONE AND ACETAMINOPHEN 5; 325 MG/1; MG/1
1 TABLET ORAL
Status: DISCONTINUED | OUTPATIENT
Start: 2024-09-04 | End: 2024-09-04 | Stop reason: HOSPADM

## 2024-09-04 RX ORDER — BUPIVACAINE HYDROCHLORIDE 5 MG/ML
INJECTION, SOLUTION EPIDURAL; INTRACAUDAL
Status: DISCONTINUED | OUTPATIENT
Start: 2024-09-04 | End: 2024-09-04 | Stop reason: HOSPADM

## 2024-09-04 RX ORDER — PROPOFOL 10 MG/ML
INJECTION, EMULSION INTRAVENOUS
Status: DISCONTINUED | OUTPATIENT
Start: 2024-09-04 | End: 2024-09-04

## 2024-09-04 RX ORDER — ONDANSETRON HYDROCHLORIDE 2 MG/ML
4 INJECTION, SOLUTION INTRAVENOUS DAILY PRN
Status: DISCONTINUED | OUTPATIENT
Start: 2024-09-04 | End: 2024-09-04 | Stop reason: HOSPADM

## 2024-09-04 RX ORDER — ONDANSETRON HYDROCHLORIDE 2 MG/ML
INJECTION, SOLUTION INTRAVENOUS
Status: DISCONTINUED | OUTPATIENT
Start: 2024-09-04 | End: 2024-09-04

## 2024-09-04 RX ORDER — GLUCAGON 1 MG
1 KIT INJECTION
Status: DISCONTINUED | OUTPATIENT
Start: 2024-09-04 | End: 2024-09-04 | Stop reason: HOSPADM

## 2024-09-04 RX ORDER — MIDAZOLAM HYDROCHLORIDE 1 MG/ML
INJECTION INTRAMUSCULAR; INTRAVENOUS
Status: DISCONTINUED | OUTPATIENT
Start: 2024-09-04 | End: 2024-09-04

## 2024-09-04 RX ORDER — LIDOCAINE HYDROCHLORIDE 20 MG/ML
INJECTION INTRAVENOUS
Status: DISCONTINUED | OUTPATIENT
Start: 2024-09-04 | End: 2024-09-04

## 2024-09-04 RX ORDER — SODIUM CHLORIDE, SODIUM LACTATE, POTASSIUM CHLORIDE, CALCIUM CHLORIDE 600; 310; 30; 20 MG/100ML; MG/100ML; MG/100ML; MG/100ML
INJECTION, SOLUTION INTRAVENOUS CONTINUOUS PRN
Status: DISCONTINUED | OUTPATIENT
Start: 2024-09-04 | End: 2024-09-04

## 2024-09-04 RX ORDER — HYDROCODONE BITARTRATE AND ACETAMINOPHEN 5; 325 MG/1; MG/1
1 TABLET ORAL EVERY 4 HOURS PRN
Status: DISCONTINUED | OUTPATIENT
Start: 2024-09-04 | End: 2024-09-04 | Stop reason: HOSPADM

## 2024-09-04 RX ORDER — SODIUM CHLORIDE, SODIUM LACTATE, POTASSIUM CHLORIDE, CALCIUM CHLORIDE 600; 310; 30; 20 MG/100ML; MG/100ML; MG/100ML; MG/100ML
INJECTION, SOLUTION INTRAVENOUS CONTINUOUS
Status: DISCONTINUED | OUTPATIENT
Start: 2024-09-04 | End: 2024-09-04 | Stop reason: HOSPADM

## 2024-09-04 RX ORDER — BUPIVACAINE HYDROCHLORIDE 5 MG/ML
INJECTION, SOLUTION EPIDURAL; INTRACAUDAL
Status: DISCONTINUED
Start: 2024-09-04 | End: 2024-09-04 | Stop reason: HOSPADM

## 2024-09-04 RX ORDER — LIDOCAINE HYDROCHLORIDE 10 MG/ML
INJECTION, SOLUTION EPIDURAL; INFILTRATION; INTRACAUDAL; PERINEURAL
Status: DISCONTINUED
Start: 2024-09-04 | End: 2024-09-04 | Stop reason: HOSPADM

## 2024-09-04 RX ORDER — DEXMEDETOMIDINE HYDROCHLORIDE 100 UG/ML
INJECTION, SOLUTION INTRAVENOUS
Status: DISCONTINUED | OUTPATIENT
Start: 2024-09-04 | End: 2024-09-04

## 2024-09-04 RX ORDER — HYDROCODONE BITARTRATE AND ACETAMINOPHEN 5; 325 MG/1; MG/1
1 TABLET ORAL EVERY 8 HOURS PRN
Qty: 20 TABLET | Refills: 0 | Status: SHIPPED | OUTPATIENT
Start: 2024-09-04

## 2024-09-04 RX ORDER — DEXAMETHASONE SODIUM PHOSPHATE 4 MG/ML
INJECTION, SOLUTION INTRA-ARTICULAR; INTRALESIONAL; INTRAMUSCULAR; INTRAVENOUS; SOFT TISSUE
Status: DISCONTINUED | OUTPATIENT
Start: 2024-09-04 | End: 2024-09-04

## 2024-09-04 RX ORDER — MEPERIDINE HYDROCHLORIDE 25 MG/ML
12.5 INJECTION INTRAMUSCULAR; INTRAVENOUS; SUBCUTANEOUS ONCE AS NEEDED
Status: DISCONTINUED | OUTPATIENT
Start: 2024-09-04 | End: 2024-09-04 | Stop reason: HOSPADM

## 2024-09-04 RX ORDER — FENTANYL CITRATE 50 UG/ML
25 INJECTION, SOLUTION INTRAMUSCULAR; INTRAVENOUS EVERY 5 MIN PRN
Status: DISCONTINUED | OUTPATIENT
Start: 2024-09-04 | End: 2024-09-04 | Stop reason: HOSPADM

## 2024-09-04 RX ORDER — CEFAZOLIN SODIUM 1 G/3ML
INJECTION, POWDER, FOR SOLUTION INTRAMUSCULAR; INTRAVENOUS
Status: DISCONTINUED | OUTPATIENT
Start: 2024-09-04 | End: 2024-09-04

## 2024-09-04 RX ADMIN — PROPOFOL 100 MG: 10 INJECTION, EMULSION INTRAVENOUS at 07:09

## 2024-09-04 RX ADMIN — PROPOFOL 50 MG: 10 INJECTION, EMULSION INTRAVENOUS at 07:09

## 2024-09-04 RX ADMIN — DEXMEDETOMIDINE HYDROCHLORIDE 10 MCG: 100 INJECTION, SOLUTION INTRAVENOUS at 07:09

## 2024-09-04 RX ADMIN — FENTANYL CITRATE 25 MCG: 50 INJECTION, SOLUTION INTRAMUSCULAR; INTRAVENOUS at 07:09

## 2024-09-04 RX ADMIN — GLYCOPYRROLATE 0.2 MG: 0.2 INJECTION, SOLUTION INTRAMUSCULAR; INTRAVITREAL at 07:09

## 2024-09-04 RX ADMIN — PROPOFOL 20 MG: 10 INJECTION, EMULSION INTRAVENOUS at 07:09

## 2024-09-04 RX ADMIN — LIDOCAINE HYDROCHLORIDE 30 MG: 20 INJECTION INTRAVENOUS at 07:09

## 2024-09-04 RX ADMIN — ONDANSETRON 4 MG: 2 INJECTION INTRAMUSCULAR; INTRAVENOUS at 07:09

## 2024-09-04 RX ADMIN — MIDAZOLAM 1 MG: 1 INJECTION INTRAMUSCULAR; INTRAVENOUS at 07:09

## 2024-09-04 RX ADMIN — MIDAZOLAM 1 MG: 1 INJECTION INTRAMUSCULAR; INTRAVENOUS at 06:09

## 2024-09-04 RX ADMIN — DEXAMETHASONE SODIUM PHOSPHATE 4 MG: 4 INJECTION, SOLUTION INTRA-ARTICULAR; INTRALESIONAL; INTRAMUSCULAR; INTRAVENOUS; SOFT TISSUE at 07:09

## 2024-09-04 RX ADMIN — CEFAZOLIN 3 G: 330 INJECTION, POWDER, FOR SOLUTION INTRAMUSCULAR; INTRAVENOUS at 07:09

## 2024-09-04 RX ADMIN — SODIUM CHLORIDE, POTASSIUM CHLORIDE, SODIUM LACTATE AND CALCIUM CHLORIDE: 600; 310; 30; 20 INJECTION, SOLUTION INTRAVENOUS at 06:09

## 2024-09-04 NOTE — TRANSFER OF CARE
"Anesthesia Transfer of Care Note    Patient: Morro Juarez    Procedure(s) Performed: Procedure(s) (LRB):  EXCISION, MASS (Left)    Patient location: PACU    Anesthesia Type: MAC    Transport from OR: Transported from OR on room air with adequate spontaneous ventilation    Post pain: adequate analgesia    Post assessment: no apparent anesthetic complications    Post vital signs: stable    Level of consciousness: awake    Nausea/Vomiting: no nausea/vomiting    Complications: none    Transfer of care protocol was followed      Last vitals: Visit Vitals  BP (!) 148/84   Pulse 66   Temp 36.3 °C (97.3 °F) (Temporal)   Resp 18   Ht 5' 11" (1.803 m)   Wt 132.9 kg (292 lb 14.1 oz)   SpO2 98%   BMI 40.85 kg/m²     "

## 2024-09-04 NOTE — PROGRESS NOTES
Subjective:     Patient ID: Morro Juarez is a 59 y.o. male.    Chief Complaint: No chief complaint on file.    Morro is a 59 y.o. male who presents for surgical management of painful left foot hematoma deformity. Patient states deformity has been present for weeks.  Onset of the symptoms was  07/22/2024 . Precipitating event: He states that he was taking a lawnmower off of a trailer when a large ramp fell onto his left foot. . Current symptoms include: ability to bear weight, but with some pain, stiffness, and swelling. Patient states deformity has not responded to conservative treatment.        Patient Active Problem List   Diagnosis    Chronic hepatitis C virus infection    HTN (hypertension)    Obesity    Hematoma of left foot    Hyperlipidemia    Cervical radiculopathy    History of tuberculosis    Medical marijuana use       Current Discharge Medication List        CONTINUE these medications which have NOT CHANGED    Details   aspirin (ECOTRIN) 81 MG EC tablet Take 81 mg by mouth.      atorvastatin (LIPITOR) 40 MG tablet Take 40 mg by mouth once daily.      cyclobenzaprine (FLEXERIL) 10 MG tablet Take 10 mg by mouth 3 (three) times daily as needed for Muscle spasms.      gabapentin (NEURONTIN) 600 MG tablet Take 600 mg by mouth 3 (three) times daily. Takes 1 tablet in the morning and 2 tablets at night      lisinopriL 10 MG tablet Take 10 mg by mouth once daily.    Comments: .      methylPREDNISolone (MEDROL DOSEPACK) 4 mg tablet Take 1 tablet (4 mg total) by mouth once daily. Use as instructed on dose pack  Qty: 1 each, Refills: 0    Associated Diagnoses: Hematoma of left foot             Review of patient's allergies indicates:  No Known Allergies    Past Surgical History:   Procedure Laterality Date    ABCESS DRAINAGE  07/28/2021    I&D with biopsies of prevertebral and retropharyngeal soft tissue and fluid collection    ABCESS DRAINAGE  06/20/2021    I&D intraorally of retropharyngeal and prevertebral  "abscess    APPENDECTOMY      CERVICAL FUSION      VATS, ROBOT-ASSISTED, OR ROBOT-ASSISTED THORACOTOMY  11/16/2020    -partial lung       Family History   Problem Relation Name Age of Onset    Diabetes Mother      Heart disease Mother      Hypertension Mother      Gout Mother      Diabetes Father      Heart disease Father      Hypertension Father      Gout Father         Social History     Socioeconomic History    Marital status:    Tobacco Use    Smoking status: Never    Smokeless tobacco: Current     Types: Snuff   Substance and Sexual Activity    Alcohol use: Not Currently    Drug use: Yes     Types: Marijuana     Comment: medical marijuana - last use 8/20/24       Vitals:    09/04/24 0550 09/04/24 0551   BP: (!) 148/84 (!) 148/84   Pulse: 66    Resp: 18    Temp: 97.3 °F (36.3 °C)    TempSrc: Temporal    SpO2: 98%    Weight: 132.9 kg (292 lb 14.1 oz)    Height: 5' 11" (1.803 m)        Hemoglobin A1C   Date Value Ref Range Status   04/10/2024 5.0 4.2 - 5.8 % Final   08/10/2023 4.9 4.2 - 5.8 % Final   02/07/2023 4.9 4.2 - 5.8 % Final       Review of Systems   Constitutional:  Negative for chills and fever.   Respiratory:  Negative for shortness of breath.    Cardiovascular:  Negative for chest pain, palpitations, orthopnea, claudication and leg swelling.   Gastrointestinal:  Negative for diarrhea, nausea and vomiting.   Musculoskeletal:  Positive for joint pain (dorsal left midfoot).   Skin:  Negative for rash.   Neurological:  Negative for dizziness, tingling, sensory change, focal weakness and weakness.   Psychiatric/Behavioral: Negative.               Objective:      PHYSICAL EXAM: Apperance: Alert and orient in no distress,well developed, and with good attention to grooming and body habits  Patient presents ambulating in tennis shoes.   Lower Extremity Physical Exam:  VASCULAR: Dorsalis pedis pulses 2/4 bilateral and Posterior Tibial pulses 2/4 bilateral. . Mild edema observed left. Varicosities absent " bilateral. Skin temperature of the lower extremities is warm to warm, proximal to distal. Hair growth WNL bilateral.  DERMATOLOGICAL: No skin rashes, subcutaneous nodules, lesions, or ulcers observed bilateral. Palpable fixated subcutaneous nodule noted to left dorsal foot.  NEUROLOGICAL: Light touch, sharp-dull, proprioception all present and equal bilaterally.    MUSCULOSKELETAL: Muscle strength is 5/5 for foot inverters, everters, plantarflexors, and dorsiflexors. Muscle tone is normal. (+) decreased pain on palpation of dorsal left midfoot.      TEST RESULTS: Ultrasound of left foot/ankle taken 08/12/2024 reveals large complex fluid collection dorsal foot area of concern presumably a large evolving hematoma given history of contusion           Assessment:       ICD-10-CM ICD-9-CM   1. Hematoma of foot  S90.30XA 924.20       Plan:   Hematoma of foot    Other orders  -     Vital signs; Standing  -     Insert peripheral IV; Standing  -     Use 1% lidocaine at IV site; Standing  -     lactated ringers infusion  -     ceFAZolin 2 g in D5W 50 mL IVPB (MB+)  -     Place in Outpatient; Standing    I counseled the patient on his conditions, regarding findings of my examination, my impressions, and usual treatment plan.   Patient to OR 09/03/2024 for surgical management for painful left foot hematoma deformity. All concerns and questions answered by myself, Dr. Konstantin DPM. No gurantees given nor implied.            Marii Pryor DPM  [facility]

## 2024-09-04 NOTE — ANESTHESIA POSTPROCEDURE EVALUATION
Anesthesia Post Evaluation    Patient: Morro Juarez    Procedure(s) Performed: Procedure(s) (LRB):  EXCISION, MASS (Left)    Final Anesthesia Type: MAC      Patient location during evaluation: PACU  Patient participation: Yes- Able to Participate  Level of consciousness: awake  Post-procedure vital signs: reviewed and stable  Pain management: adequate  Airway patency: patent    PONV status at discharge: No PONV  Anesthetic complications: no      Cardiovascular status: stable  Respiratory status: unassisted  Hydration status: euvolemic  Follow-up not needed.              Vitals Value Taken Time   /69 09/04/24 0830   Temp 36.5 °C (97.7 °F) 09/04/24 0745   Pulse 65 09/04/24 0830   Resp 19 09/04/24 0830   SpO2 98 % 09/04/24 0830         No case tracking events are documented in the log.      Pain/Belkys Score: Belkys Score: 9 (9/4/2024  8:30 AM)

## 2024-09-04 NOTE — OP NOTE
Patient: Morro Juarez  : 1965  MR# [id number]  DOS:2024  Surgeon: Dr. Marii ROSADOPDILCIA  Pre-Op Dx: Painful left foot Soft Tissue Mass  Post-Op Dx: Painful left foot Soft Tissue Mass  Procedure: Excision of painful left foot Soft Tissue Mass   Anesthesia: IV sedation with local anesthesia  Hemostasis: Pneumatic left ankle tourniquet @ 250mmHg  EBL: 5cc  Materials: 3-0 nylon  Injectables: pre-op: 20cc of 1:1 mixture of 1% Lidocaine plain and 0.5%                                   Marcaine plain                      post-op: 10cc of 0.5% Marcaine plain   Findings: Tissue consistent with hematoma and cyst    Procedure in Detail:  Patient was brought into operating room and placed on operating table in the supine position. A pneumatic ankle tourniquet was then place about the patients left ankle. Following IV sedation, local anesthesia was obtained about the patients left lateral foot utilizing a total of 20cc of 1:1 mixture of 1% Lidocaine plain and 0.5% Marcaine plain. The left foot was then scrubbed, prepped, and draped in the usual aseptic manner. The pneumatic ankle tourniquet was then inflated.  Attention was then directed to the soft tissue mass of the left  foot. A linear incision over the soft tissue mass was made. The soft tissue mass was noted superficially under the skin. Next utilizing sharp and blunt dissection the soft tissue mass was carefully dissectred from the superficial skin and resected and passed from the operative field. Tissue consistent with hematoma and cyst. The area was then reassessed and found to have no soft tissue mass remaining. The wound was then irrigated with sterile normal saline. The skin was then reapproximated with 3-0 nylon. Upon completion of the procedure a postoperative block consisting of 10cc of 0.5% Marcaine plain was injected about the incison site. The wound was then dressed with steri strips, adaptic, gauze, chichi, and ACE.  At this time, a  pneumatic ankle tourniquet was then deflated and a prompt hyperemic response was noted to all toes of the left foot. The patient tolerated anesthesia and procedure well. The patient was transported to PACU with vital signs stable and neurovascular status intact to the left foot.

## 2024-09-04 NOTE — DISCHARGE INSTRUCTIONS
Keep dressing dry, clean, and intact on left foot. Do not get left foot wet. Rest and elevate left foot for first 24-48 hours after surgery. Ambulate with surgical shoe to left foot.  Take pain medication as prescribed.   Follow written post operative instructions as given on last podiatry visit. Use contact information for Dr. Marii Pryor given on written post operative instructions.

## 2024-09-04 NOTE — BRIEF OP NOTE
[facility] - Periop Services  Brief Operative Note    Surgery Date: 9/4/2024     Surgeons and Role:     * Marii Pryor DPM - Primary    Assisting Surgeon: None    Pre-op Diagnosis:  Hematoma of left foot [S90.32XA]  Contusion of left foot, subsequent encounter [S90.32XD]    Post-op Diagnosis:  Post-Op Diagnosis Codes:     * Hematoma of left foot [S90.32XA]     * Contusion of left foot, subsequent encounter [S90.32XD]    Procedure(s) (LRB):  EXCISION, MASS (Left)    Anesthesia: Local MAC    Operative Findings: Soft tissue consistent with hematoma/cyst noted to left dorsal midfoot    Estimated Blood Loss: 10 mL         Specimens:   Specimen (24h ago, onward)      None              Discharge Note    OUTCOME: Patient tolerated treatment/procedure well without complication and is now ready for discharge.    DISPOSITION: Home or Self Care    FINAL DIAGNOSIS:  <principal problem not specified>    FOLLOWUP: In clinic    DISCHARGE INSTRUCTIONS:  No discharge procedures on file.

## 2024-09-10 ENCOUNTER — PATIENT MESSAGE (OUTPATIENT)
Dept: PODIATRY | Facility: CLINIC | Age: 59
End: 2024-09-10
Payer: COMMERCIAL

## 2024-09-12 ENCOUNTER — OFFICE VISIT (OUTPATIENT)
Dept: PODIATRY | Facility: CLINIC | Age: 59
End: 2024-09-12
Payer: COMMERCIAL

## 2024-09-12 VITALS — HEIGHT: 71 IN | BODY MASS INDEX: 41.02 KG/M2 | WEIGHT: 293 LBS

## 2024-09-12 DIAGNOSIS — Z98.890 STATUS POST LEFT FOOT SURGERY: Primary | ICD-10-CM

## 2024-09-12 DIAGNOSIS — S90.32XA HEMATOMA OF LEFT FOOT: ICD-10-CM

## 2024-09-12 PROCEDURE — 3044F HG A1C LEVEL LT 7.0%: CPT | Mod: CPTII,S$GLB,, | Performed by: PODIATRIST

## 2024-09-12 PROCEDURE — 4010F ACE/ARB THERAPY RXD/TAKEN: CPT | Mod: CPTII,S$GLB,, | Performed by: PODIATRIST

## 2024-09-12 PROCEDURE — 99024 POSTOP FOLLOW-UP VISIT: CPT | Mod: S$GLB,,, | Performed by: PODIATRIST

## 2024-09-12 PROCEDURE — 99999 PR PBB SHADOW E&M-EST. PATIENT-LVL III: CPT | Mod: PBBFAC,,, | Performed by: PODIATRIST

## 2024-09-12 PROCEDURE — 1159F MED LIST DOCD IN RCRD: CPT | Mod: CPTII,S$GLB,, | Performed by: PODIATRIST

## 2024-09-12 PROCEDURE — 1160F RVW MEDS BY RX/DR IN RCRD: CPT | Mod: CPTII,S$GLB,, | Performed by: PODIATRIST

## 2024-09-12 NOTE — PROGRESS NOTES
Subjective:     Patient ID: Morro Juarez is a 59 y.o. male.    Chief Complaint: Post-op Evaluation (Left foot post-op, pt c/o  0/10 pain, PCP Dr. Chapman last seen 8-27-24)    HPI: This 59 year old male returns to the clinic 1 weeks status post left soft tissue excision procedure. Patient has no complaints of fever chills or sweats. Negative pain. Patient states dressing was kept dry, clean, and intact.     Patient Active Problem List   Diagnosis    Chronic hepatitis C virus infection    HTN (hypertension)    Obesity    Hematoma of left foot    Hyperlipidemia    Cervical radiculopathy    History of tuberculosis    Medical marijuana use       Medication List with Changes/Refills   Current Medications    ASPIRIN (ECOTRIN) 81 MG EC TABLET    Take 81 mg by mouth.    ATORVASTATIN (LIPITOR) 40 MG TABLET    Take 40 mg by mouth once daily.    CYCLOBENZAPRINE (FLEXERIL) 10 MG TABLET    Take 10 mg by mouth 3 (three) times daily as needed for Muscle spasms.    GABAPENTIN (NEURONTIN) 600 MG TABLET    Take 600 mg by mouth 3 (three) times daily. Takes 1 tablet in the morning and 2 tablets at night    HYDROCODONE-ACETAMINOPHEN (NORCO) 5-325 MG PER TABLET    Take 1 tablet by mouth every 8 (eight) hours as needed for Pain.    LISINOPRIL 10 MG TABLET    Take 10 mg by mouth once daily.    METHYLPREDNISOLONE (MEDROL DOSEPACK) 4 MG TABLET    Take 1 tablet (4 mg total) by mouth once daily. Use as instructed on dose pack       Review of patient's allergies indicates:  No Known Allergies    Past Surgical History:   Procedure Laterality Date    ABCESS DRAINAGE  07/28/2021    I&D with biopsies of prevertebral and retropharyngeal soft tissue and fluid collection    ABCESS DRAINAGE  06/20/2021    I&D intraorally of retropharyngeal and prevertebral abscess    APPENDECTOMY      CERVICAL FUSION      EXCISION, MASS Left 9/4/2024    Procedure: EXCISION, MASS;  Surgeon: Marii Pryor DPM;  Location: [facility];  Service: Podiatry;  Laterality:  "Left;    VATS, ROBOT-ASSISTED, OR ROBOT-ASSISTED THORACOTOMY  11/16/2020    -partial lung       Family History   Problem Relation Name Age of Onset    Diabetes Mother      Heart disease Mother      Hypertension Mother      Gout Mother      Diabetes Father      Heart disease Father      Hypertension Father      Gout Father         Social History     Socioeconomic History    Marital status:    Tobacco Use    Smoking status: Never    Smokeless tobacco: Current     Types: Snuff   Substance and Sexual Activity    Alcohol use: Not Currently    Drug use: Yes     Types: Marijuana     Comment: medical marijuana - last use 8/20/24       Vitals:    09/12/24 0821   Weight: 132.9 kg (292 lb 15.9 oz)   Height: 5' 11" (1.803 m)   PainSc: 0-No pain       Review of Systems   Constitutional:  Negative for chills and fever.   Respiratory:  Negative for shortness of breath.    Cardiovascular:  Negative for chest pain, palpitations, orthopnea, claudication and leg swelling.   Gastrointestinal:  Negative for diarrhea, nausea and vomiting.   Musculoskeletal:  Negative for joint pain.   Skin:  Negative for rash.   Neurological:  Negative for dizziness, tingling, sensory change, focal weakness and weakness.   Psychiatric/Behavioral: Negative.             Objective:      Physical examination: General: Patient is in no acute distress, alert and oriented x 3.  Dressing to left foot clean, dry, and intact.   Lower Extremity Exam:  Vascular: Dorsalis pedis and Posterior tibial pulses palpable on left foot.  Capillary fill time <3 sec to toes on left foot. Mild edema noted on left foot.   Dermatologic: Sutures Intact. Incision site well copated on left foot. Negative erythema, drainage, or increased temp noted to surgical site.   Neurological: Light touch sensation intact to left foot.   Musculoskeletal: Negative pain on palpation/ROM of left foot.         Assessment:       ICD-10-CM ICD-9-CM   1. Status post left foot surgery - Left Foot  " Z98.890 V45.89   2. Hematoma of left foot - Left Foot  S90.32XA 924.20       Plan:   Status post left foot surgery - Left Foot    Hematoma of left foot - Left Foot      I counseled the patient on his conditions, regarding findings of my examination, my impressions, and usual treatment plan.   Left foot dressed with Betadine soaked adaptic, gauze, kerlix, and ACE.   Patient instructed to keep dressing dry, clean and intact.   Patient instructed to continue to ambulate in post op shoe  Patient should call the clinic immediately if any signs of infection such as fever chills sweats increased redness or pain.  Patient to return in 1 week for suture removal.        Marii Pryor DPM  [facility]

## 2024-09-18 ENCOUNTER — OFFICE VISIT (OUTPATIENT)
Dept: PODIATRY | Facility: CLINIC | Age: 59
End: 2024-09-18
Payer: COMMERCIAL

## 2024-09-18 VITALS — WEIGHT: 293 LBS | HEIGHT: 71 IN | BODY MASS INDEX: 41.02 KG/M2

## 2024-09-18 DIAGNOSIS — Z98.890 STATUS POST LEFT FOOT SURGERY: Primary | ICD-10-CM

## 2024-09-18 DIAGNOSIS — S90.32XA HEMATOMA OF LEFT FOOT: ICD-10-CM

## 2024-09-18 PROCEDURE — 3044F HG A1C LEVEL LT 7.0%: CPT | Mod: CPTII,S$GLB,, | Performed by: PODIATRIST

## 2024-09-18 PROCEDURE — 1160F RVW MEDS BY RX/DR IN RCRD: CPT | Mod: CPTII,S$GLB,, | Performed by: PODIATRIST

## 2024-09-18 PROCEDURE — 4010F ACE/ARB THERAPY RXD/TAKEN: CPT | Mod: CPTII,S$GLB,, | Performed by: PODIATRIST

## 2024-09-18 PROCEDURE — 99024 POSTOP FOLLOW-UP VISIT: CPT | Mod: S$GLB,,, | Performed by: PODIATRIST

## 2024-09-18 PROCEDURE — 1159F MED LIST DOCD IN RCRD: CPT | Mod: CPTII,S$GLB,, | Performed by: PODIATRIST

## 2024-09-18 PROCEDURE — 99999 PR PBB SHADOW E&M-EST. PATIENT-LVL III: CPT | Mod: PBBFAC,,, | Performed by: PODIATRIST

## 2024-09-18 NOTE — PROGRESS NOTES
Subjective:     Patient ID: Morro Juarez is a 59 y.o. male.    Chief Complaint: Post-op Evaluation (Left foot post-op, pt c/o 0 /10 pain, PCP Dr Chapman last seen 8-27-24)    HPI: This 59 year old male returns to the clinic 2 weeks status post soft tissue excision procedure. Patient has no complaints of fever chills or sweats. Negative pain. Patient states dressing was kept dry, clean, and intact.     Patient Active Problem List   Diagnosis    Chronic hepatitis C virus infection    HTN (hypertension)    Obesity    Hematoma of left foot    Hyperlipidemia    Cervical radiculopathy    History of tuberculosis    Medical marijuana use       Medication List with Changes/Refills   Current Medications    ASPIRIN (ECOTRIN) 81 MG EC TABLET    Take 81 mg by mouth.    ATORVASTATIN (LIPITOR) 40 MG TABLET    Take 40 mg by mouth once daily.    CYCLOBENZAPRINE (FLEXERIL) 10 MG TABLET    Take 10 mg by mouth 3 (three) times daily as needed for Muscle spasms.    GABAPENTIN (NEURONTIN) 600 MG TABLET    Take 600 mg by mouth 3 (three) times daily. Takes 1 tablet in the morning and 2 tablets at night    HYDROCODONE-ACETAMINOPHEN (NORCO) 5-325 MG PER TABLET    Take 1 tablet by mouth every 8 (eight) hours as needed for Pain.    LISINOPRIL 10 MG TABLET    Take 10 mg by mouth once daily.    METHYLPREDNISOLONE (MEDROL DOSEPACK) 4 MG TABLET    Take 1 tablet (4 mg total) by mouth once daily. Use as instructed on dose pack       Review of patient's allergies indicates:  No Known Allergies    Past Surgical History:   Procedure Laterality Date    ABCESS DRAINAGE  07/28/2021    I&D with biopsies of prevertebral and retropharyngeal soft tissue and fluid collection    ABCESS DRAINAGE  06/20/2021    I&D intraorally of retropharyngeal and prevertebral abscess    APPENDECTOMY      CERVICAL FUSION      EXCISION, MASS Left 9/4/2024    Procedure: EXCISION, MASS;  Surgeon: Marii Pryor DPM;  Location: [facility];  Service: Podiatry;  Laterality: Left;     "VATS, ROBOT-ASSISTED, OR ROBOT-ASSISTED THORACOTOMY  11/16/2020    -partial lung       Family History   Problem Relation Name Age of Onset    Diabetes Mother      Heart disease Mother      Hypertension Mother      Gout Mother      Diabetes Father      Heart disease Father      Hypertension Father      Gout Father         Social History     Socioeconomic History    Marital status:    Tobacco Use    Smoking status: Never    Smokeless tobacco: Current     Types: Snuff   Substance and Sexual Activity    Alcohol use: Not Currently    Drug use: Yes     Types: Marijuana     Comment: medical marijuana - last use 8/20/24       Vitals:    09/18/24 0748   Weight: 132.9 kg (292 lb 15.9 oz)   Height: 5' 11" (1.803 m)       ROS        Objective:      Physical examination: General: Patient is in no acute distress, alert and oriented x 3.  Dressing to left foot clean, dry, and intact.   Lower Extremity Exam:  Vascular: Dorsalis pedis and Posterior tibial pulses palpable on left foot.  Capillary fill time <3 sec to toes on left foot. No edema noted on left foot.   Dermatologic: Sutures Intact. Incision site well copated on left foot. Negative erythema, drainage, or increased temp noted to surgical site.   Neurological: Light touch sensation intact to left foot.   Musculoskeletal: Negative pain on palpation/ROM of left foot.         Assessment:       ICD-10-CM ICD-9-CM   1. Status post left foot surgery - Left Foot  Z98.890 V45.89   2. Hematoma of left foot - Left Foot  S90.32XA 924.20       Plan:   Status post left foot surgery - Left Foot    Hematoma of left foot - Left Foot    I counseled the patient on his conditions, regarding findings of my examination, my impressions, and usual treatment plan.   With permission all sutures removed with #11 without incident.   Left foot dressed with Betadine soaked adaptic, gauze, kerlix, and coban and remove in 24 hours.   Patient instructed to keep dressing dry, clean and intact. "   Patient instructed to continue to ambulate in post op shoe.   Patient should call the clinic immediately if any signs of infection such as fever chills sweats increased redness or pain.  Patient to return in 1 month or sooner if needed.        Marii Pryor DPM  [facility]

## 2024-10-22 ENCOUNTER — OFFICE VISIT (OUTPATIENT)
Dept: PODIATRY | Facility: CLINIC | Age: 59
End: 2024-10-22
Payer: COMMERCIAL

## 2024-10-22 VITALS — WEIGHT: 293 LBS | HEIGHT: 71 IN | BODY MASS INDEX: 41.02 KG/M2

## 2024-10-22 DIAGNOSIS — Z98.890 STATUS POST LEFT FOOT SURGERY: Primary | ICD-10-CM

## 2024-10-22 PROCEDURE — 1160F RVW MEDS BY RX/DR IN RCRD: CPT | Mod: CPTII,S$GLB,, | Performed by: PODIATRIST

## 2024-10-22 PROCEDURE — 99024 POSTOP FOLLOW-UP VISIT: CPT | Mod: S$GLB,,, | Performed by: PODIATRIST

## 2024-10-22 PROCEDURE — 3044F HG A1C LEVEL LT 7.0%: CPT | Mod: CPTII,S$GLB,, | Performed by: PODIATRIST

## 2024-10-22 PROCEDURE — 1159F MED LIST DOCD IN RCRD: CPT | Mod: CPTII,S$GLB,, | Performed by: PODIATRIST

## 2024-10-22 PROCEDURE — 99999 PR PBB SHADOW E&M-EST. PATIENT-LVL III: CPT | Mod: PBBFAC,,, | Performed by: PODIATRIST

## 2024-10-22 PROCEDURE — 4010F ACE/ARB THERAPY RXD/TAKEN: CPT | Mod: CPTII,S$GLB,, | Performed by: PODIATRIST

## 2024-10-22 NOTE — PROGRESS NOTES
Subjective:     Patient ID: Morro Juarez is a 59 y.o. male.    Chief Complaint: Post-op Evaluation (Left foot post-op, pt c/o  0/10 pain, non-diabetic pt wears tennis shoes,PCP Dr. Chapman last seen 8-27-24)    HPI: This 59 year old male returns to the clinic 1 months status post left foot procedure. Patient has no complaints of fever chills or sweats. Negative pain. Patient states dressing was kept dry, clean, and intact.     Patient Active Problem List   Diagnosis    Chronic hepatitis C virus infection    HTN (hypertension)    Obesity    Hematoma of left foot    Hyperlipidemia    Cervical radiculopathy    History of tuberculosis    Medical marijuana use       Medication List with Changes/Refills   Current Medications    ASPIRIN (ECOTRIN) 81 MG EC TABLET    Take 81 mg by mouth.    ATORVASTATIN (LIPITOR) 40 MG TABLET    Take 40 mg by mouth once daily.    CYCLOBENZAPRINE (FLEXERIL) 10 MG TABLET    Take 10 mg by mouth 3 (three) times daily as needed for Muscle spasms.    GABAPENTIN (NEURONTIN) 600 MG TABLET    Take 600 mg by mouth 3 (three) times daily. Takes 1 tablet in the morning and 2 tablets at night    HYDROCODONE-ACETAMINOPHEN (NORCO) 5-325 MG PER TABLET    Take 1 tablet by mouth every 8 (eight) hours as needed for Pain.    LISINOPRIL 10 MG TABLET    Take 10 mg by mouth once daily.    METHYLPREDNISOLONE (MEDROL DOSEPACK) 4 MG TABLET    Take 1 tablet (4 mg total) by mouth once daily. Use as instructed on dose pack       Review of patient's allergies indicates:  No Known Allergies    Past Surgical History:   Procedure Laterality Date    ABCESS DRAINAGE  07/28/2021    I&D with biopsies of prevertebral and retropharyngeal soft tissue and fluid collection    ABCESS DRAINAGE  06/20/2021    I&D intraorally of retropharyngeal and prevertebral abscess    APPENDECTOMY      CERVICAL FUSION      EXCISION, MASS Left 9/4/2024    Procedure: EXCISION, MASS;  Surgeon: Marii Pryor DPM;  Location: [facility];  Service:  "Podiatry;  Laterality: Left;    VATS, ROBOT-ASSISTED, OR ROBOT-ASSISTED THORACOTOMY  11/16/2020    -partial lung       Family History   Problem Relation Name Age of Onset    Diabetes Mother      Heart disease Mother      Hypertension Mother      Gout Mother      Diabetes Father      Heart disease Father      Hypertension Father      Gout Father         Social History     Socioeconomic History    Marital status:    Tobacco Use    Smoking status: Never    Smokeless tobacco: Current     Types: Snuff   Substance and Sexual Activity    Alcohol use: Not Currently    Drug use: Yes     Types: Marijuana     Comment: medical marijuana - last use 8/20/24       Vitals:    10/22/24 0807   Weight: 132.9 kg (292 lb 15.9 oz)   Height: 5' 11" (1.803 m)   PainSc: 0-No pain       Review of Systems   Constitutional:  Negative for chills and fever.   Respiratory:  Negative for shortness of breath.    Cardiovascular:  Negative for chest pain, palpitations, orthopnea, claudication and leg swelling.   Gastrointestinal:  Negative for diarrhea, nausea and vomiting.   Musculoskeletal:  Negative for joint pain.   Skin:  Negative for rash.   Neurological:  Negative for dizziness, tingling, sensory change, focal weakness and weakness.   Psychiatric/Behavioral: Negative.             Objective:      Physical examination: General: Patient is in no acute distress, alert and oriented x 3.  Patient ambulating in tennis shoes.    Lower Extremity Exam:  Vascular: Dorsalis pedis and Posterior tibial pulses palpable on left foot.  Capillary fill time <3 sec to toes on left foot. Minimal edema noted on left foot.   Dermatologic: Incision site well copated on left foot. Negative erythema, drainage, or increased temp noted to surgical site.   Neurological: Light touch sensation intact to left foot.   Musculoskeletal: Negative pain on palpation/ROM of left foot.         Assessment:       ICD-10-CM ICD-9-CM   1. Status post left foot surgery - Left Foot "  Z98.890 V45.89       Plan:   Status post left foot surgery - Left Foot      I counseled the patient on his conditions, regarding findings of my examination, my impressions, and usual treatment plan.   The patient and I reviewed the types of shoes he should be wearing, my recommendation includes generally the best time of the day for a shoe fitting is the afternoon, shoes with a wide toe box, very good cushion, and tennis shoes with removable inner soles.The patient and I reviewed my recommendations for over-the-counter orthotic inserts.   Patient should call the clinic immediately if any signs of infection such as fever chills sweats increased redness or pain.  Patient to return in 1 month or sooner if needed.        Marii Pryor DPM  [facility]

## (undated) DEVICE — SUT 4-0 ETHILON 18 PS-2

## (undated) DEVICE — SUT VICRYL PLUS 3-0 PS2 18

## (undated) DEVICE — ELECTRODE REM PLYHSV RETURN 9

## (undated) DEVICE — BLADE SCALP OPHTL RND TIP

## (undated) DEVICE — DRAPE SURG W/TWL 17 5/8X23

## (undated) DEVICE — APPLICATOR CHLORAPREP ORN 26ML

## (undated) DEVICE — PACK BASIC SETUP SC BR

## (undated) DEVICE — SPONGE COTTON TRAY 4X4IN

## (undated) DEVICE — GLOVE BIOGEL ECLIPSE SZ 6.5

## (undated) DEVICE — PAD CAST SPECIALIST STRL 4

## (undated) DEVICE — SUT VICRYL 4-0 ANTIBACT

## (undated) DEVICE — DRAPE U SPLIT SHEET 54X76IN

## (undated) DEVICE — GOWN POLY REINF BRTH SLV XL

## (undated) DEVICE — KIT TURNOVER

## (undated) DEVICE — TOWEL OR DISP STRL BLUE 4/PK

## (undated) DEVICE — BANDAGE MATRIX HK LOOP 4IN 5YD

## (undated) DEVICE — SYR 10CC LUER LOCK

## (undated) DEVICE — BANDAGE ROLL COTTN 4.5INX4.1YD

## (undated) DEVICE — STOCKINET TUBULAR 1 PLY 6X60IN

## (undated) DEVICE — NDL HYPO 27G X 1 1/2

## (undated) DEVICE — BANDAGE ESMARK ELASTIC ST 4X9

## (undated) DEVICE — SUT ETHILON 3-0 PS2 18 BLK

## (undated) DEVICE — DRAPE T EXTRM SURG 121X128X90

## (undated) DEVICE — BLADE SURG #15 CARBON STEEL

## (undated) DEVICE — DRESSING N ADH OIL EMUL 3X3

## (undated) DEVICE — MANIFOLD 4 PORT

## (undated) DEVICE — COVER LIGHT HANDLE 80/CA